# Patient Record
Sex: FEMALE | Race: WHITE | NOT HISPANIC OR LATINO | Employment: FULL TIME | URBAN - METROPOLITAN AREA
[De-identification: names, ages, dates, MRNs, and addresses within clinical notes are randomized per-mention and may not be internally consistent; named-entity substitution may affect disease eponyms.]

---

## 2017-03-21 ENCOUNTER — MYC MEDICAL ADVICE (OUTPATIENT)
Dept: FAMILY MEDICINE | Facility: OTHER | Age: 37
End: 2017-03-21

## 2017-10-20 ENCOUNTER — ALLIED HEALTH/NURSE VISIT (OUTPATIENT)
Dept: FAMILY MEDICINE | Facility: OTHER | Age: 37
End: 2017-10-20
Payer: COMMERCIAL

## 2017-10-20 DIAGNOSIS — Z23 NEED FOR PROPHYLACTIC VACCINATION AND INOCULATION AGAINST INFLUENZA: Primary | ICD-10-CM

## 2017-10-20 PROCEDURE — 99207 ZZC NO CHARGE NURSE ONLY: CPT

## 2017-10-20 PROCEDURE — 90471 IMMUNIZATION ADMIN: CPT

## 2017-10-20 PROCEDURE — 90686 IIV4 VACC NO PRSV 0.5 ML IM: CPT

## 2017-10-20 NOTE — PROGRESS NOTES
Prior to injection verified patient identity using patient's name and date of birth.    Injectable Influenza Immunization Documentation    1.  Is the person to be vaccinated sick today?   No    2. Does the person to be vaccinated have an allergy to a component   of the vaccine?   No  Egg Allergy Algorithm Link    3. Has the person to be vaccinated ever had a serious reaction   to influenza vaccine in the past?   No    4. Has the person to be vaccinated ever had Guillain-Barré syndrome?   No    Form completed by Elin Medina [facility] ([facility])  Per orders of Romero Hernandez, injection of Flu given by Elin Medina. Patient instructed to remain in clinic for 15 minutes afterwards, and to report any adverse reaction to me immediately.

## 2017-10-20 NOTE — MR AVS SNAPSHOT
After Visit Summary   10/20/2017    Sharon Caraballo    MRN: [id number]           Patient Information     Date Of Birth          1980        Visit Information        Provider Department      10/20/2017 4:00 PM NL FLU SHOT [facility]        Today's Diagnoses     Need for prophylactic vaccination and inoculation against influenza    -  1       Follow-ups after your visit        Your next 10 appointments already scheduled     Nov 27, 2017  2:30 PM [facility]   [facility] Rad[facility]    [address] [facility] [address] [phone]              Who to contact     If you have questions or need follow up information about today's clinic visit or your schedule please contact [facility] directly at [phone].  Normal or non-critical lab and imaging results will be communicated to you by MyChart, letter or phone within 4 business days after the clinic has received the results. If you do not hear from us within 7 days, please contact the clinic through MyChart or phone. If you have a critical or abnormal lab result, we will notify you by phone as soon as possible.  Submit refill requests through [employer] or call your pharmacy and they will forward the refill request to us. Please allow 3 business days for your refill to be completed.          Additional Information About Your Visit        MyChart Information     [employer] gives you secure access to your electronic health record. If you see a primary care provider, you can also send messages to your care team and make appointments. If you have questions, please call your primary care clinic.  If you do not have a primary care provider, please call [phone] and they will assist you.        Care EveryWhere ID     This is your Care EveryWhere ID. This could be used by other organizations to access your [facility]  records  [id number]         Blood Pressure from Last 3 Encounters:   11/23/16 102/58   06/30/15 100/60   05/19/15 101/65    Weight from Last 3 Encounters:   11/23/16 138 lb 14.4 oz (63 kg)   06/30/15 156 lb (70.8 kg)   05/11/15 179 lb (81.2 kg)              We Performed the Following     FLU VAC, SPLIT VIRUS IM > 3 YO (QUADRIVALENT) [59088]     Vaccine Administration, Initial [55589]        Primary Care Provider Office Phone # Fax #    Neda Roqueisabella Mancilla [phone] [address] [place] [address]        Equal Access to Services     DARRION WHITE : Hadii ellie hearn Soginette, waaxda luqadaha, qaybta kaalmada hayden, katie darden. So [facility] [phone].    ATENCIÓN: Si habla español, tiene a woods disposición servicios gratuitos de asistencia lingüística. Llame al [phone].    We comply with applicable federal civil rights laws and [place] laws. We do not discriminate on the basis of race, color, national origin, age, disability, sex, sexual orientation, or gender identity.            Thank you!     Thank you for choosing [facility]  for your care. Our goal is always to provide you with excellent care. Hearing back from our patients is one way we can continue to improve our services. Please take a few minutes to complete the written survey that you may receive in the mail after your visit with us. Thank you!             Your Updated Medication List - Protect others around you: Learn how to safely use, store and throw away your medicines at www.disposemymeds.org.          This list is accurate as of: 10/20/17  4:50 PM.  Always use your most recent med list.                   Brand Name Dispense Instructions for use Diagnosis    buPROPion 150 MG 12 hr tablet    WELLBUTRIN SR    60 tablet    Take 1 tablet once daily and increase to 1 tablet twice daily after 4 to 7 days    Encounter for tobacco use cessation counseling       ferrous  gluconate 324 (38 FE) MG tablet    FERGON    90 tablet    Take 1 tablet (324 mg) by mouth daily (with breakfast)        norethindrone 0.35 MG per tablet    MICRONOR    3 Package    Take 1 tablet (0.35 mg) by mouth daily    General counseling for prescription of oral contraceptives       order for DME     1 Device    Equipment being ordered:  Electric breast pump Sig:  Use as directed    Breast feeding status of mother       oxyCODONE-acetaminophen 5-325 MG per tablet    PERCOCET    30 tablet    Take 1 tablet by mouth every 4 hours as needed for moderate to severe pain     (spontaneous vaginal delivery)       prenatal multivitamin plus iron 27-0.8 MG Tabs per tablet      Take 1 tablet by mouth daily        triamcinolone 0.1 % cream    KENALOG    120 g    Apply sparingly to affected area two times daily as needed    Rash and nonspecific skin eruption

## 2017-11-21 NOTE — PROGRESS NOTES
SUBJECTIVE:   CC: Sharon Caraballo is an 36 year old woman who presents for preventive health visit.     Physical   Annual:     Getting at least 3 servings of Calcium per day::  NO    Bi-annual eye exam::  Yes    Dental care twice a year::  Yes    Sleep apnea or symptoms of sleep apnea::  None    Diet::  Regular (no restrictions)    Frequency of exercise::  None    Taking medications regularly::  Yes    Medication side effects::  Not applicable    Additional concerns today::  YES (check spot on right spot on tip of nose, tobacco cessation )      The spot on her nose has been there for a year or so it is not changing except maybe is getting darker. He has no symptoms associated with no spontaneous bleeding    She is interested in quitting smoking, she tried Wellbutrin however gave her violent diarrhea and vomiting. She tried it twice thinking perhaps she just had gastroenteritis but it happened both times. She is apprehensive to take chantix. She is smoking less than a pack a day.        Today's PHQ-2 Score:   PHQ-2 ( 1999 Pfizer) 11/27/2017   Q1: Little interest or pleasure in doing things 1   Q2: Feeling down, depressed or hopeless 1   PHQ-2 Score 2   Q1: Little interest or pleasure in doing things Several days   Q2: Feeling down, depressed or hopeless Several days   PHQ-2 Score 2       Abuse: Current or Past(Physical, Sexual or Emotional)- No  Do you feel safe in your environment - Yes    Social History   Substance Use Topics     Smoking status: Current Every Day Smoker     Packs/day: 0.50     Years: 10.00     Types: Cigarettes     Last attempt to quit: 1/23/2017     Smokeless tobacco: Never Used      Comment: 4-5 cigs per day     Alcohol use No      Comment: rare     The patient does not drink >3 drinks per day nor >7 drinks per week.    Reviewed orders with patient.  Reviewed health maintenance and updated orders accordingly - Yes  Labs reviewed in EPIC  BP Readings from Last 3 Encounters:   11/27/17  "102/60   11/23/16 102/58   06/30/15 100/60    Wt Readings from Last 3 Encounters:   11/27/17 131 lb (59.4 kg)   11/23/16 138 lb 14.4 oz (63 kg)   06/30/15 156 lb (70.8 kg)                      Mammogram not appropriate for this patient based on age.    Pertinent mammograms are reviewed under the imaging tab.  History of abnormal Pap smear:   Last 3 Pap and HPV Results:   PAP / HPV 7/18/2014   PAP NIL       Reviewed and updated as needed this visit by clinical staffTobacco  Allergies  Meds  Med Hx  Surg Hx  Fam Hx  Soc Hx        Reviewed and updated as needed this visit by Provider            Review of Systems  C: NEGATIVE for fever, chills, change in weight  INTEGUMENTARY/SKIN: Spot on nose  E: NEGATIVE for vision changes or irritation  ENT: NEGATIVE for ear, mouth and throat problems  R: NEGATIVE for significant cough or SOB  B: NEGATIVE for masses, tenderness or discharge  CV: Intermittent episodes of palpitations lasting only seconds and was associated symptoms  She has had them in the past, the completely resolved when she was not using any caffeine or tobacco during her most recent pregnancy  GI: NEGATIVE for nausea, abdominal pain, heartburn, or change in bowel habits  : NEGATIVE for unusual urinary or vaginal symptoms. Periods are regular.  MUSCULOSKELETAL:Low back pain, better since the birth of her daughter 3 years ago does not require any medications to treat pain  N: NEGATIVE for weakness, dizziness or paresthesias  PSYCHIATRIC: Anxiety at times nothing requiring treatment     OBJECTIVE:   /60  Pulse 94  Temp 98.4  F (36.9  C) (Temporal)  Resp 12  Ht 5' 2.8\" (1.595 m)  Wt 131 lb (59.4 kg)  LMP 11/08/2017 (Approximate)  BMI 23.36 kg/m2  Physical Exam  GENERAL: healthy, alert and no distress  EYES: Eyes grossly normal to inspection, PERRL and conjunctivae and sclerae normal  HENT: ear canals and TM's normal, nose and mouth without ulcers or lesions  NECK: no adenopathy, no asymmetry, " masses, or scars and thyroid normal to palpation  RESP: lungs clear to auscultation - no rales, rhonchi or wheezes  BREAST: normal without masses, tenderness or nipple discharge and no palpable axillary masses or adenopathy  CV: regular rate and rhythm, normal S1 S2, no S3 or S4, no murmur, click or rub, no peripheral edema and peripheral pulses strong  ABDOMEN: soft, nontender, no hepatosplenomegaly, no masses and bowel sounds normal   (female): normal female external genitalia, normal urethral meatus, vaginal mucosa pink, moist, well rugated, and normal cervix/adnexa/uterus without masses or discharge  MS: no gross musculoskeletal defects noted, no edema  SKIN: no suspicious lesions or rashes  SKIN: Erythematous rough nevus on the right side of the tip of her nose  NEURO: Normal strength and tone, mentation intact and speech normal  PSYCH: mentation appears normal, affect normal/bright    ASSESSMENT/PLAN:   1. Encounter for routine adult health examination without abnormal findings  Healthy female    2. Screening for malignant neoplasm of cervix    - Pap imaged thin layer screen with HPV - recommended age 30 - 65  - HPV High Risk Types DNA Cervical    3. Encounter for tobacco use cessation counseling  She will not smoke while using the patch, If palpitations are changing worsening or do not resolve when she is quit smoking she will let me know  - nicotine (NICODERM CQ) 14 MG/24HR 24 hr patch; Place 1 patch onto the skin every 24 hours  Dispense: 30 patch; Refill: 0  - nicotine (NICODERM CQ) 7 MG/24HR 24 hr patch; Place 1 patch onto the skin every 24 hours  Dispense: 30 patch; Refill: 0    4. Nevus    - DERMATOLOGY REFERRAL    COUNSELING:  Reviewed preventive health counseling, as reflected in patient instructions         reports that she has been smoking Cigarettes.  She has a 5.00 pack-year smoking history. She has never used smokeless tobacco.  Tobacco Cessation Action Plan: Pharmacotherapies : Nicotine  "patch  Estimated body mass index is 23.36 kg/(m^2) as calculated from the following:    Height as of this encounter: 5' 2.8\" (1.595 m).    Weight as of this encounter: 131 lb (59.4 kg).         Counseling Resources:  ATP IV Guidelines  Pooled Cohorts Equation Calculator  Breast Cancer Risk Calculator  FRAX Risk Assessment  ICSI Preventive Guidelines  Dietary Guidelines for Americans, 2010  USDA's MyPlate  ASA Prophylaxis  Lung CA Screening    Radha Trent PA-C  [facility] HospitalElectronically signed by Radha Trent PA-C   "

## 2017-11-26 ENCOUNTER — HEALTH MAINTENANCE LETTER (OUTPATIENT)
Age: 37
End: 2017-11-26

## 2017-11-27 ENCOUNTER — OFFICE VISIT (OUTPATIENT)
Dept: FAMILY MEDICINE | Facility: OTHER | Age: 37
End: 2017-11-27
Payer: COMMERCIAL

## 2017-11-27 VITALS
RESPIRATION RATE: 12 BRPM | BODY MASS INDEX: 23.21 KG/M2 | HEART RATE: 94 BPM | SYSTOLIC BLOOD PRESSURE: 102 MMHG | HEIGHT: 63 IN | DIASTOLIC BLOOD PRESSURE: 60 MMHG | TEMPERATURE: 98.4 F | WEIGHT: 131 LBS

## 2017-11-27 DIAGNOSIS — Z71.6 ENCOUNTER FOR TOBACCO USE CESSATION COUNSELING: ICD-10-CM

## 2017-11-27 DIAGNOSIS — Z00.00 ENCOUNTER FOR ROUTINE ADULT HEALTH EXAMINATION WITHOUT ABNORMAL FINDINGS: Primary | ICD-10-CM

## 2017-11-27 DIAGNOSIS — Z12.4 SCREENING FOR MALIGNANT NEOPLASM OF CERVIX: ICD-10-CM

## 2017-11-27 DIAGNOSIS — D22.9 NEVUS: ICD-10-CM

## 2017-11-27 PROCEDURE — 87624 HPV HI-RISK TYP POOLED RSLT: CPT | Performed by: PHYSICIAN ASSISTANT

## 2017-11-27 PROCEDURE — 99395 PREV VISIT EST AGE 18-39: CPT | Performed by: PHYSICIAN ASSISTANT

## 2017-11-27 PROCEDURE — G0145 SCR C/V CYTO,THINLAYER,RESCR: HCPCS | Performed by: PHYSICIAN ASSISTANT

## 2017-11-27 RX ORDER — NICOTINE 21 MG/24HR
1 PATCH, TRANSDERMAL 24 HOURS TRANSDERMAL EVERY 24 HOURS
Qty: 30 PATCH | Refills: 0 | Status: SHIPPED | OUTPATIENT
Start: 2017-11-27 | End: 2019-10-04

## 2017-11-27 ASSESSMENT — PAIN SCALES - GENERAL: PAINLEVEL: NO PAIN (0)

## 2017-11-27 NOTE — NURSING NOTE
"Chief Complaint   Patient presents with     Physical     Panel Management     vanesa, pap       Initial /60  Pulse 94  Temp 98.4  F (36.9  C) (Temporal)  Resp 12  Ht 5' 2.8\" (1.595 m)  Wt 131 lb (59.4 kg)  LMP 11/08/2017 (Approximate)  BMI 23.36 kg/m2 Estimated body mass index is 23.36 kg/(m^2) as calculated from the following:    Height as of this encounter: 5' 2.8\" (1.595 m).    Weight as of this encounter: 131 lb (59.4 kg).  Medication Reconciliation: complete     Anayeli Garnica CMA (AAMA)    "

## 2017-11-27 NOTE — MR AVS SNAPSHOT
After Visit Summary   11/27/2017    Sharon Caraballo    MRN: [id number]           Patient Information     Date Of Birth          1980        Visit Information        Provider Department      11/27/2017 2:30 PM Radha Trent PA-C [facility]        Today's Diagnoses     Encounter for routine adult health examination without abnormal findings    -  1    Screening for malignant neoplasm of cervix        Encounter for tobacco use cessation counseling        Nevus          Care Instructions      Preventive Health Recommendations  Female Ages 26 - 39  Yearly exam:   See your health care provider every year in order to    Review health changes.     Discuss preventive care.      Review your medicines if you your doctor has prescribed any.    Until age 30: Get a Pap test every three years (more often if you have had an abnormal result).    After age 30: Talk to your doctor about whether you should have a Pap test every 3 years or have a Pap test with HPV screening every 5 years.   You do not need a Pap test if your uterus was removed (hysterectomy) and you have not had cancer.  You should be tested each year for STDs (sexually transmitted diseases), if you're at risk.   Talk to your provider about how often to have your cholesterol checked.  If you are at risk for diabetes, you should have a diabetes test (fasting glucose).  Shots: Get a flu shot each year. Get a tetanus shot every 10 years.   Nutrition:     Eat at least 5 servings of fruits and vegetables each day.    Eat whole-grain bread, whole-wheat pasta and brown rice instead of white grains and rice.    Talk to your provider about Calcium and Vitamin D.     Lifestyle    Exercise at least 150 minutes a week (30 minutes a day, 5 days of the week). This will help you control your weight and prevent disease.    Limit alcohol to one drink per day.    No smoking.     Wear sunscreen to prevent skin cancer.    See your dentist every six  months for an exam and cleaning.            Follow-ups after your visit        Additional Services     DERMATOLOGY REFERRAL       Your provider has referred you to: [facility]: [facility] [phone]   [email or web address]    Please be aware that coverage of these services is subject to the terms and limitations of your health insurance plan.  Call member services at your health plan with any benefit or coverage questions.      Please bring the following with you to your appointment:    (1) Any X-Rays, CTs or MRIs which have been performed.  Contact the facility where they were done to arrange for  prior to your scheduled appointment.    (2) List of current medications  (3) This referral request   (4) Any documents/labs given to you for this referral                  Who to contact     If you have questions or need follow up information about today's clinic visit or your schedule please contact [facility] [place] directly at [phone].  Normal or non-critical lab and imaging results will be communicated to you by [employer], letter or phone within 4 business days after the clinic has received the results. If you do not hear from us within 7 days, please contact the clinic through [employer] or phone. If you have a critical or abnormal lab result, we will notify you by phone as soon as possible.  Submit refill requests through [employer] or call your pharmacy and they will forward the refill request to us. Please allow 3 business days for your refill to be completed.          Additional Information About Your Visit        [employer] Information     [employer] gives you secure access to your electronic health record. If you see a primary care provider, you can also send messages to your care team and make appointments. If you have questions, please call your primary care clinic.  If you do not have a primary care provider, please call  "[phone] and they will assist you.        Care EveryWhere ID     This is your Care EveryWhere ID. This could be used by other organizations to access your [place] medical records  [id number]        Your Vitals Were     Pulse Temperature Respirations Height Last Period BMI (Body Mass Index)    94 98.4  F (36.9  C) (Temporal) 12 5' 2.8\" (1.595 m) 11/08/2017 (Approximate) 23.36 kg/m2       Blood Pressure from Last 3 Encounters:   11/27/17 102/60   11/23/16 102/58   06/30/15 100/60    Weight from Last 3 Encounters:   11/27/17 131 lb (59.4 kg)   11/23/16 138 lb 14.4 oz (63 kg)   06/30/15 156 lb (70.8 kg)              We Performed the Following     DERMATOLOGY REFERRAL     HPV High Risk Types DNA Cervical     Pap imaged thin layer screen with HPV - recommended age 30 - 65          Today's Medication Changes          These changes are accurate as of: 11/27/17  3:12 PM.  If you have any questions, ask your nurse or doctor.               Start taking these medicines.        Dose/Directions    * nicotine 14 MG/24HR 24 hr patch   Commonly known as:  NICODERM CQ   Used for:  Encounter for tobacco use cessation counseling   Started by:  Radha Trent PA-C        Dose:  1 patch   Place 1 patch onto the skin every 24 hours   Quantity:  30 patch   Refills:  0       * nicotine 7 MG/24HR 24 hr patch   Commonly known as:  NICODERM CQ   Used for:  Encounter for tobacco use cessation counseling   Started by:  Radha Trent PA-C        Dose:  1 patch   Place 1 patch onto the skin every 24 hours   Quantity:  30 patch   Refills:  0       * Notice:  This list has 2 medication(s) that are the same as other medications prescribed for you. Read the directions carefully, and ask your doctor or other care provider to review them with you.      Stop taking these medicines if you haven't already. Please contact your care team if you have questions.     buPROPion 150 MG 12 hr tablet   Commonly known as:  WELLBUTRIN SR   Stopped by:  " Radha Trent PA-C                Where to get your medicines      These medications were sent to [facility] - [place], [place] - [address]   [address] [place] [address]     Phone:  [phone]     nicotine 14 MG/24HR 24 hr patch    nicotine 7 MG/24HR 24 hr patch                Primary Care Provider Office Phone # Fax #    Neda Mancilla [phone] [address] [place] [address]        Equal Access to Services     [facility]: Hadii aad ku hadasho Soomaali, waaxda luqadaha, qaybta kaalmada adeegyada, waxay idiin hayaan adeeg kharash la'aan . So [facility] [phone].    ATENCIÓN: Si habla español, tiene a woods disposición servicios gratuitos de asistencia lingüística. LlMetroHealth Main Campus Medical Center [phone].    We comply with applicable federal civil rights laws and [place] laws. We do not discriminate on the basis of race, color, national origin, age, disability, sex, sexual orientation, or gender identity.            Thank you!     Thank you for choosing [facility]  for your care. Our goal is always to provide you with excellent care. Hearing back from our patients is one way we can continue to improve our services. Please take a few minutes to complete the written survey that you may receive in the mail after your visit with us. Thank you!             Your Updated Medication List - Protect others around you: Learn how to safely use, store and throw away your medicines at www.disposemymeds.org.          This list is accurate as of: 11/27/17  3:12 PM.  Always use your most recent med list.                   Brand Name Dispense Instructions for use Diagnosis    * nicotine 14 MG/24HR 24 hr patch    NICODERM CQ    30 patch    Place 1 patch onto the skin every 24 hours    Encounter for tobacco use cessation counseling       * nicotine 7 MG/24HR 24 hr patch    NICODERM CQ    30 patch    Place 1 patch onto the skin every 24 hours    Encounter for  tobacco use cessation counseling       * Notice:  This list has 2 medication(s) that are the same as other medications prescribed for you. Read the directions carefully, and ask your doctor or other care provider to review them with you.

## 2017-11-29 LAB
COPATH REPORT: NORMAL
PAP: NORMAL

## 2017-11-30 LAB
FINAL DIAGNOSIS: NORMAL
HPV HR 12 DNA CVX QL NAA+PROBE: NEGATIVE
HPV16 DNA SPEC QL NAA+PROBE: NEGATIVE
HPV18 DNA SPEC QL NAA+PROBE: NEGATIVE
SPECIMEN DESCRIPTION: NORMAL

## 2017-12-11 ENCOUNTER — TELEPHONE (OUTPATIENT)
Dept: FAMILY MEDICINE | Facility: CLINIC | Age: 37
End: 2017-12-11

## 2017-12-11 NOTE — LETTER
[facility]  [address]  [place] [address]  [phone]      December 11, 2017    Sharon Caraballo                                                                                                                     [address] [address] [place] [id number]        Dear Sharon,    We received a notice that you are to be scheduled with a specialty clinic. The referral has been placed by your provider and you can call to schedule an appointment directly.     Enclosed, you will find the referral with the phone number to call to schedule an appointment.  If you have already scheduled this, you may disregard this letter.    Please call us if you have any questions or concerns.        Sincerely,     [facility] Support Staff / paulo

## 2017-12-11 NOTE — TELEPHONE ENCOUNTER
You placed a referral for patient to dermatology on 11/27/17.  Patient has not scheduled as of yet.      Please review and forward to team if follow up with the patient is needed.     Thank you!  Rachel/Clinic Referrals Dyad II

## 2018-08-31 ENCOUNTER — OFFICE VISIT (OUTPATIENT)
Dept: FAMILY MEDICINE | Facility: OTHER | Age: 38
End: 2018-08-31
Payer: COMMERCIAL

## 2018-08-31 ENCOUNTER — TELEPHONE (OUTPATIENT)
Dept: FAMILY MEDICINE | Facility: OTHER | Age: 38
End: 2018-08-31

## 2018-08-31 VITALS
SYSTOLIC BLOOD PRESSURE: 96 MMHG | HEART RATE: 104 BPM | DIASTOLIC BLOOD PRESSURE: 60 MMHG | WEIGHT: 137 LBS | HEIGHT: 63 IN | BODY MASS INDEX: 24.27 KG/M2 | RESPIRATION RATE: 14 BRPM | TEMPERATURE: 98.7 F | OXYGEN SATURATION: 100 %

## 2018-08-31 DIAGNOSIS — J20.9 ACUTE BRONCHITIS WITH SYMPTOMS > 10 DAYS: Primary | ICD-10-CM

## 2018-08-31 DIAGNOSIS — F17.200 TOBACCO DEPENDENCE SYNDROME: ICD-10-CM

## 2018-08-31 PROCEDURE — 99213 OFFICE O/P EST LOW 20 MIN: CPT | Performed by: STUDENT IN AN ORGANIZED HEALTH CARE EDUCATION/TRAINING PROGRAM

## 2018-08-31 RX ORDER — AZITHROMYCIN 250 MG/1
TABLET, FILM COATED ORAL
Qty: 6 TABLET | Refills: 0 | Status: SHIPPED | OUTPATIENT
Start: 2018-08-31 | End: 2018-09-19

## 2018-08-31 ASSESSMENT — PATIENT HEALTH QUESTIONNAIRE - PHQ9
10. IF YOU CHECKED OFF ANY PROBLEMS, HOW DIFFICULT HAVE THESE PROBLEMS MADE IT FOR YOU TO DO YOUR WORK, TAKE CARE OF THINGS AT HOME, OR GET ALONG WITH OTHER PEOPLE: NOT DIFFICULT AT ALL
SUM OF ALL RESPONSES TO PHQ QUESTIONS 1-9: 0
SUM OF ALL RESPONSES TO PHQ QUESTIONS 1-9: 0

## 2018-08-31 ASSESSMENT — PAIN SCALES - GENERAL: PAINLEVEL: NO PAIN (0)

## 2018-08-31 NOTE — MR AVS SNAPSHOT
"              After Visit Summary   8/31/2018    Sharon Caraballo    MRN: [id number]           Patient Information     Date Of Birth          1980        Visit Information        Provider Department      8/31/2018 2:20 PM Ashley Post APRN [facility]        Today's Diagnoses     Acute bronchitis with symptoms > 10 days    -  1      Care Instructions    Azithromycin - 2 tablets today and then one tablet daily for 4 days.    Come back in if your symptoms are worsening.    Ashley Post, NP-C            Follow-ups after your visit        Who to contact     If you have questions or need follow up information about today's clinic visit or your schedule please contact [facility] directly at [phone].  Normal or non-critical lab and imaging results will be communicated to you by Barnaclehart, letter or phone within 4 business days after the clinic has received the results. If you do not hear from us within 7 days, please contact the clinic through Barnaclehart or phone. If you have a critical or abnormal lab result, we will notify you by phone as soon as possible.  Submit refill requests through Yoink Games or call your pharmacy and they will forward the refill request to us. Please allow 3 business days for your refill to be completed.          Additional Information About Your Visit        MyChart Information     Yoink Games lets you send messages to your doctor, view your test results, renew your prescriptions, schedule appointments and more. To sign up, go to [email or web address] . Click on \"Log in\" on the left side of the screen, which will take you to the Welcome page. Then click on \"Sign up Now\" on the right side of the page.     You will be asked to enter the access code listed below, as well as some personal information. Please follow the directions to create your username and password.     Your access code is: [email or web address]  Expires: 11/29/2018  2:44 PM     Your " "access code will  in 90 days. If you need help or a new code, please call your [facility] clinic or [phone].        Care EveryWhere ID     This is your Care EveryWhere ID. This could be used by other organizations to access your [facility] medical records  [id number]        Your Vitals Were     Pulse Temperature Respirations Height Pulse Oximetry BMI (Body Mass Index)    104 98.7  F (37.1  C) 14 5' 2.99\" (1.6 m) 100% 24.27 kg/m2       Blood Pressure from Last 3 Encounters:   18 96/60   17 102/60   16 102/58    Weight from Last 3 Encounters:   18 137 lb (62.1 kg)   17 131 lb (59.4 kg)   16 138 lb 14.4 oz (63 kg)              Today, you had the following     No orders found for display         Today's Medication Changes          These changes are accurate as of 18  2:44 PM.  If you have any questions, ask your nurse or doctor.               Start taking these medicines.        Dose/Directions    azithromycin 250 MG tablet   Commonly known as:  ZITHROMAX   Used for:  Acute bronchitis with symptoms > 10 days   Started by:  Ashley Post APRN CNP        Two tablets first day, then one tablet daily for four days.   Quantity:  6 tablet   Refills:  0            Where to get your medicines      These medications were sent to [facility] [address]   [address] [place] [address]     Phone:  [phone]     azithromycin 250 MG tablet                Primary Care Provider Office Phone # Fax #    Radha Trent PA-C [phone] [address] [place] [address]        Equal Access to Services     DARRION WHITE AH: Shahriar Saleh, pelon escobar, qamendelta kaalmada hayden, katie darden. So [facility] [phone].    ATENCIÓN: Si habla español, tiene a woods disposición servicios gratuitos de asistencia lingüística. Llame al [phone].    We comply with " applicable federal civil rights laws and [place] laws. We do not discriminate on the basis of race, color, national origin, age, disability, sex, sexual orientation, or gender identity.            Thank you!     Thank you for choosing [facility]  for your care. Our goal is always to provide you with excellent care. Hearing back from our patients is one way we can continue to improve our services. Please take a few minutes to complete the written survey that you may receive in the mail after your visit with us. Thank you!             Your Updated Medication List - Protect others around you: Learn how to safely use, store and throw away your medicines at www.disposemymeds.org.          This list is accurate as of 8/31/18  2:44 PM.  Always use your most recent med list.                   Brand Name Dispense Instructions for use Diagnosis    azithromycin 250 MG tablet    ZITHROMAX    6 tablet    Two tablets first day, then one tablet daily for four days.    Acute bronchitis with symptoms > 10 days       * nicotine 14 MG/24HR 24 hr patch    NICODERM CQ    30 patch    Place 1 patch onto the skin every 24 hours    Encounter for tobacco use cessation counseling       * nicotine 7 MG/24HR 24 hr patch    NICODERM CQ    30 patch    Place 1 patch onto the skin every 24 hours    Encounter for tobacco use cessation counseling       * Notice:  This list has 2 medication(s) that are the same as other medications prescribed for you. Read the directions carefully, and ask your doctor or other care provider to review them with you.

## 2018-08-31 NOTE — PATIENT INSTRUCTIONS
Azithromycin - 2 tablets today and then one tablet daily for 4 days.    Come back in if your symptoms are worsening.    TYE OlivaC

## 2018-08-31 NOTE — TELEPHONE ENCOUNTER
Spoke with patient.  Cough is just lingering.  Started last week.  Sinus and ear pressure and then into chest. Getting greenish up when coughing.  Not sleeping well at up, restless.  No fever.  No headaches.    Huddled with EM.    Ok to work in this afternoon.    RECOMMENDED DISPOSITION:  See in 24 hours -   Will comply with recommendation: yes   If further questions/concerns or if Sx do not improve, worsen or new Sx develop, call your PCP or [facility] as soon as possible.    NOTES:  Disposition was determined by the first positive assessment question, therefore all previous assessment questions were negative.     Guideline used:  Telephone Triage Protocols for Nurses, Fifth Edition, Frances Poole, GABY, BSN

## 2018-08-31 NOTE — TELEPHONE ENCOUNTER
Reason for Call:  Same Day Appointment, Requested Provider:  any provider in [facility]    PCP: Radha Trent    Reason for visit:  Cold symptoms, cough, phlegm.  Sinus     Duration of symptoms: 1 week    Have you been treated for this in the past? No    Additional comments:  Patient would like to be seen today in [place]    Can we leave a detailed message on this number? YES    Phone number patient can be reached at: Cell number on file:    Telephone Information:   Mobile [phone]       Best Time: any    Call taken on 8/31/2018 at 11:59 AM by Ingrid Mosqueda

## 2018-08-31 NOTE — PROGRESS NOTES
"  SUBJECTIVE:   Sharon Caraballo is a 37 year old female who presents to clinic today for the following health issues:      History of Present Illness     Diet:  Other  Frequency of exercise:  None  Taking medications regularly:  Yes  Medication side effects:  Not applicable  Additional concerns today:  No    Acute Illness   Acute illness concerns: Cough, chest and sinus congestion  Onset: over a week    Fever: no     Chills/Sweats: no     Headache (location?): YES    Sinus Pressure:YES    Conjunctivitis:  no    Ear Pain: YES: left    Rhinorrhea: no     Congestion: YES    Sore Throat: no      Cough: YES    Wheeze: YES    Decreased Appetite: no     Nausea: no     Vomiting: no     Diarrhea:  no     Dysuria/Freq.: no     Fatigue/Achiness: no     Sick/Strep Exposure: no      Therapies Tried and outcome: OTC allergy med without relief    Problem list and histories reviewed & adjusted, as indicated.  Additional history: as documented    Labs reviewed in EPIC    ROS:  Constitutional, HEENT, cardiovascular, pulmonary, gi and gu systems are negative, except as otherwise noted.    OBJECTIVE:     BP 96/60  Pulse 104  Temp 98.7  F (37.1  C)  Resp 14  Ht 5' 2.99\" (1.6 m)  Wt 137 lb (62.1 kg)  SpO2 100%  BMI 24.27 kg/m2  Body mass index is 24.27 kg/(m^2).  GENERAL: alert and fatigued  EYES: Eyes grossly normal to inspection, PERRL and conjunctivae and sclerae normal  HENT: ear canals normal, TMs dull, posterior pharynx erythematous, tenderness to palpation over maxillary sinuses, nasal passages congested  NECK: no adenopathy, no asymmetry, masses, or scars   RESP: lungs clear to auscultation - no rales, rhonchi or wheezes  CV: regular rate and rhythm, normal S1 S2, no S3 or S4, no murmur, click or rub  MS: no gross musculoskeletal defects noted  SKIN: no suspicious lesions or rashes  NEURO: Normal strength and tone, mentation intact and speech normal  PSYCH: mentation appears normal, affect normal/bright, judgement and " insight intact and appearance well groomed    Diagnostic Test Results:  none     ASSESSMENT/PLAN:     1. Acute bronchitis with symptoms > 10 days  2. Tobacco dependence syndrome  Given symptoms are worsening, purulent sputum and that patient is a smoker, I have recommended treatment with antibiotic. Recommended patient follow up if symptoms persist or worsen.  - azithromycin (ZITHROMAX) 250 MG tablet; Two tablets first day, then one tablet daily for four days.  Dispense: 6 tablet; Refill: 0    CHAZ Krishna [facility] for HPI/ROS submitted by the patient on 8/31/2018   PHQ-2 Score: 0  If you checked off any problems, how difficult have these problems made it for you to do your work, take care of things at home, or get along with other people?: Not difficult at all  PHQ9 TOTAL SCORE: 0

## 2018-09-01 ASSESSMENT — PATIENT HEALTH QUESTIONNAIRE - PHQ9: SUM OF ALL RESPONSES TO PHQ QUESTIONS 1-9: 0

## 2018-09-19 ENCOUNTER — OFFICE VISIT (OUTPATIENT)
Dept: FAMILY MEDICINE | Facility: OTHER | Age: 38
End: 2018-09-19
Payer: COMMERCIAL

## 2018-09-19 VITALS
OXYGEN SATURATION: 100 % | RESPIRATION RATE: 16 BRPM | TEMPERATURE: 99.2 F | SYSTOLIC BLOOD PRESSURE: 122 MMHG | HEART RATE: 95 BPM | BODY MASS INDEX: 24.98 KG/M2 | DIASTOLIC BLOOD PRESSURE: 64 MMHG | WEIGHT: 141 LBS

## 2018-09-19 DIAGNOSIS — L30.9 DERMATITIS: Primary | ICD-10-CM

## 2018-09-19 PROCEDURE — 99213 OFFICE O/P EST LOW 20 MIN: CPT | Performed by: PHYSICIAN ASSISTANT

## 2018-09-19 RX ORDER — TRIAMCINOLONE ACETONIDE 5 MG/G
CREAM TOPICAL
Qty: 30 G | Refills: 0 | Status: SHIPPED | OUTPATIENT
Start: 2018-09-19 | End: 2019-10-04

## 2018-09-19 NOTE — PATIENT INSTRUCTIONS
This appears to be an allergic reaction to something, but I am unsure exactly as to what.  It is not contagious.  I recommend steroid cream to use 3 times daily as needed for the itching and redness along with nightly Benadryl.  Avoid hot showers.  If you have a worsening rash or it is not improving, you should be seen again.

## 2018-09-19 NOTE — NURSING NOTE
"Chief Complaint   Patient presents with     Derm Problem       Initial /64  Pulse 95  Temp 99.2  F (37.3  C) (Temporal)  Resp 16  Wt 141 lb (64 kg)  SpO2 100%  BMI 24.98 kg/m2 Estimated body mass index is 24.98 kg/(m^2) as calculated from the following:    Height as of 8/31/18: 5' 2.99\" (1.6 m).    Weight as of this encounter: 141 lb (64 kg).  Medication Reconciliation: complete    Nancy Cali, TANIA      "

## 2018-09-19 NOTE — PROGRESS NOTES
"  SUBJECTIVE:   Sharon Caraballo is a 37 year old female who presents to clinic today for the following health issues:      HPI  Rash  Onset: woke up with it this morning    Description:   Location: \"on mid torso, buttocks, knees are itching.\"  Character: round, raised, red, \"looked like welts this morning\"  Itching (Pruritis): YES    Progression of Symptoms:  improving    Accompanying Signs & Symptoms:  Fever: no   Body aches or joint pain: no   Sore throat symptoms: no   Recent cold symptoms: no - did have a cough a few weeks ago, was on Zithromax on 08/31    History:   Previous similar rash: no     Precipitating factors:   Exposure to similar rash: no   New exposures: None   Recent travel: no     Alleviating factors:  none    Therapies Tried and outcome: \"immediately took a shower when I woke up but otherwise nothing\"    Sharon presents the clinic with concern of a rash she noticed this morning. The rash is raised, pink, and distributed along both hips and buttocks and over both knees. The rash is itchy. This morning there was a burning pain after she took a shower but now it is more itchy and irritating. She reports that some of the spots have went away and most of them now appear flat. She denies any numbness or tingling.   She denies any new products, foods (besides bugles last night), clothing, or medications. She not taken any medications orally or applied anything topically to the area. She inspected her bed and sheets and did not see anything.  does not have a rash. She reports mild congestion which has been improving following an episode of bronchitis in which she was seen in clinic on 08/31/18 for and prescribed zithromax. She denies any previous rashes to zithromax.  Denies recent travel or sick contacts.     Problem list and histories reviewed & adjusted, as indicated.    ROS:  CONSTITUTIONAL: NEGATIVE for fever, chills, change in weight  INTEGUMENTARY/SKIN: +Itching rash.  ENT/MOUTH: " NEGATIVE for ear, throat problems, + for congestion which is improving  RESP: NEGATIVE for significant cough or SOB  CV: NEGATIVE for chest pain, palpitations or peripheral edema  MUSCULOSKELETAL: NEGATIVE for significant arthralgias or myalgia  NEURO: NEGATIVE for weakness, dizziness or paresthesias    OBJECTIVE:     /64  Pulse 95  Temp 99.2  F (37.3  C) (Temporal)  Resp 16  Wt 141 lb (64 kg)  SpO2 100%  BMI 24.98 kg/m2  Body mass index is 24.98 kg/(m^2).     GENERAL: healthy, alert and no distress  RESP: lungs clear to auscultation - no rales, rhonchi or wheezes  CV: regular rate and rhythm, normal S1 S2, no S3 or S4, no murmur, click or rub  SKIN: many erythematous, blanchable, wheal-like plaques with a few macular lesions distributed along the lateral aspect of bilateral thighs and buttocks. Patches on the lateral aspect of bilateral knees slightly erythematous and blanchable. No warmth, tenderness, or drainage.     Diagnostic Test Results:  none     ASSESSMENT/PLAN:         ICD-10-CM    1. Dermatitis L30.9 triamcinolone (KENALOG) 0.5 % cream       Clinical presentation is consistent with dermatitis/allergic reaction of unknown etiology. Will prescribe Kenalog cream to apply directly to the irritated skin 3 times daily as needed to alleviate itching and redness. She can also use benadryl at night to relieve itching. She was recommended to avoid hot showers. We discussed that the rash is not contagious. If it seems to be worsening or not improving, she should be seen again. Otherwise, follow-up in 2 months for routine physical. She was encouraged to call the clinic with any concerns in the meantime.     Patient was seen in conjunction with Isabel GREER.    Jerad Villatoro PA-C  [facility]

## 2018-09-19 NOTE — MR AVS SNAPSHOT
After Visit Summary   9/19/2018    Sharon Caraballo    MRN: [id number]           Patient Information     Date Of Birth          1980        Visit Information        Provider Department      9/19/2018 10:00 AM Jerad Villatoro PA-C [facility]        Today's Diagnoses     Dermatitis    -  1      Care Instructions    This appears to be an allergic reaction to something, but I am unsure exactly as to what.  It is not contagious.  I recommend steroid cream to use 3 times daily as needed for the itching and redness along with nightly Benadryl.  Avoid hot showers.  If you have a worsening rash or it is not improving, you should be seen again.              Follow-ups after your visit        Follow-up notes from your care team     Return in about 2 months (around 11/19/2018).      Who to contact     If you have questions or need follow up information about today's clinic visit or your schedule please contact [facility] directly at [phone].  Normal or non-critical lab and imaging results will be communicated to you by Platypit, letter or phone within 4 business days after the clinic has received the results. If you do not hear from us within 7 days, please contact the clinic through Evolva or phone. If you have a critical or abnormal lab result, we will notify you by phone as soon as possible.  Submit refill requests through Evolva or call your pharmacy and they will forward the refill request to us. Please allow 3 business days for your refill to be completed.          Additional Information About Your Visit        Syncing.Nethart Information     Evolva gives you secure access to your electronic health record. If you see a primary care provider, you can also send messages to your care team and make appointments. If you have questions, please call your primary care clinic.  If you do not have a primary care provider, please call [phone] and they will assist you.         Care EveryWhere ID     This is your Care EveryWhere ID. This could be used by other organizations to access your [facility] medical records  [id number]        Your Vitals Were     Pulse Temperature Respirations Pulse Oximetry BMI (Body Mass Index)       95 99.2  F (37.3  C) (Temporal) 16 100% 24.98 kg/m2        Blood Pressure from Last 3 Encounters:   09/19/18 122/64   08/31/18 96/60   11/27/17 102/60    Weight from Last 3 Encounters:   09/19/18 141 lb (64 kg)   08/31/18 137 lb (62.1 kg)   11/27/17 131 lb (59.4 kg)              Today, you had the following     No orders found for display         Today's Medication Changes          These changes are accurate as of 9/19/18 10:43 AM.  If you have any questions, ask your nurse or doctor.               Start taking these medicines.        Dose/Directions    triamcinolone 0.5 % cream   Commonly known as:  KENALOG   Used for:  Dermatitis   Started by:  Jerad Villatoro PA-C        Apply sparingly to affected area three times daily.   Quantity:  30 g   Refills:  0            Where to get your medicines      These medications were sent to [facility] [address]  [address] [facility] [address]     Phone:  [phone]     triamcinolone 0.5 % cream                Primary Care Provider Office Phone # Fax #    Radha Trent PA-C [phone] [address] [place] [address]        Equal Access to Services     DARRION WHITE AH: Hadii ellie phillips hadasho Soomaali, waaxda luqadaha, qaybta kaalmada alexegyasruthi, katie darden. So [facility] [phone].    ATENCIÓN: Si habla español, tiene a woods disposición servicios gratuitos de asistencia lingüística. Llame al [phone].    We comply with applicable federal civil rights laws and [place] laws. We do not discriminate on the basis of race, color, national origin, age, disability, sex, sexual orientation, or gender identity.            Thank  you!     Thank you for choosing [facility]  for your care. Our goal is always to provide you with excellent care. Hearing back from our patients is one way we can continue to improve our services. Please take a few minutes to complete the written survey that you may receive in the mail after your visit with us. Thank you!             Your Updated Medication List - Protect others around you: Learn how to safely use, store and throw away your medicines at www.disposemymeds.org.          This list is accurate as of 9/19/18 10:43 AM.  Always use your most recent med list.                   Brand Name Dispense Instructions for use Diagnosis    * nicotine 14 MG/24HR 24 hr patch    NICODERM CQ    30 patch    Place 1 patch onto the skin every 24 hours    Encounter for tobacco use cessation counseling       * nicotine 7 MG/24HR 24 hr patch    NICODERM CQ    30 patch    Place 1 patch onto the skin every 24 hours    Encounter for tobacco use cessation counseling       triamcinolone 0.5 % cream    KENALOG    30 g    Apply sparingly to affected area three times daily.    Dermatitis       * Notice:  This list has 2 medication(s) that are the same as other medications prescribed for you. Read the directions carefully, and ask your doctor or other care provider to review them with you.

## 2018-11-16 ENCOUNTER — ALLIED HEALTH/NURSE VISIT (OUTPATIENT)
Dept: FAMILY MEDICINE | Facility: OTHER | Age: 38
End: 2018-11-16
Payer: COMMERCIAL

## 2018-11-16 DIAGNOSIS — Z23 NEED FOR PROPHYLACTIC VACCINATION AND INOCULATION AGAINST INFLUENZA: Primary | ICD-10-CM

## 2018-11-16 PROCEDURE — 90471 IMMUNIZATION ADMIN: CPT

## 2018-11-16 PROCEDURE — 90686 IIV4 VACC NO PRSV 0.5 ML IM: CPT

## 2018-11-16 PROCEDURE — 99207 ZZC NO CHARGE NURSE ONLY: CPT

## 2018-11-16 NOTE — PROGRESS NOTES
Injectable Influenza Immunization Documentation    1.  Is the person to be vaccinated sick today?   No    2. Does the person to be vaccinated have an allergy to a component   of the vaccine?   No  Egg Allergy Algorithm Link    3. Has the person to be vaccinated ever had a serious reaction   to influenza vaccine in the past?   No    4. Has the person to be vaccinated ever had Guillain-Barré syndrome?   No    Form completed by Serina Huffman CMA    Prior to injection verified patient identity using patient's name and date of birth.  Due to injection administration, patient instructed to remain in clinic for 15 minutes  afterwards, and to report any adverse reaction to me immediately.

## 2018-11-16 NOTE — MR AVS SNAPSHOT
After Visit Summary   11/16/2018    Sharon Caraballo    MRN: [id number]           Patient Information     Date Of Birth          1980        Visit Information        Provider Department      11/16/2018 3:30 PM NL FLU SHOT [facility]        Today's Diagnoses     Need for prophylactic vaccination and inoculation against influenza    -  1       Follow-ups after your visit        Who to contact     If you have questions or need follow up information about today's clinic visit or your schedule please contact [facility] directly at [phone].  Normal or non-critical lab and imaging results will be communicated to you by MyChart, letter or phone within 4 business days after the clinic has received the results. If you do not hear from us within 7 days, please contact the clinic through MyChart or phone. If you have a critical or abnormal lab result, we will notify you by phone as soon as possible.  Submit refill requests through Siftit or call your pharmacy and they will forward the refill request to us. Please allow 3 business days for your refill to be completed.          Additional Information About Your Visit        Care EveryWhere ID     This is your Care EveryWhere ID. This could be used by other organizations to access your [employer] medical records  [id number]         Blood Pressure from Last 3 Encounters:   09/19/18 122/64   08/31/18 96/60   11/27/17 102/60    Weight from Last 3 Encounters:   09/19/18 141 lb (64 kg)   08/31/18 137 lb (62.1 kg)   11/27/17 131 lb (59.4 kg)              We Performed the Following     FLU VACCINE, SPLIT VIRUS, IM (QUADRIVALENT) [72919]- >3 YRS     Vaccine Administration, Initial [00346]        Primary Care Provider Office Phone # Fax #    Radha Trent PA-C [phone] [address] [place] [address]        Equal Access to Services     LES WHITE : pelon Suárez,  bradley princessnatalee alexkatie branch ah. So [facility] [phone].    ATENCIÓN: Si prince jaramillo, tiene a woods disposición servicios gratuitos de asistencia lingüística. Jennifer al [phone].    We comply with applicable federal civil rights laws and [place] laws. We do not discriminate on the basis of race, color, national origin, age, disability, sex, sexual orientation, or gender identity.            Thank you!     Thank you for choosing [facility]  for your care. Our goal is always to provide you with excellent care. Hearing back from our patients is one way we can continue to improve our services. Please take a few minutes to complete the written survey that you may receive in the mail after your visit with us. Thank you!             Your Updated Medication List - Protect others around you: Learn how to safely use, store and throw away your medicines at www.disposemymeds.org.          This list is accurate as of 11/16/18  4:07 PM.  Always use your most recent med list.                   Brand Name Dispense Instructions for use Diagnosis    * nicotine 14 MG/24HR 24 hr patch    NICODERM CQ    30 patch    Place 1 patch onto the skin every 24 hours    Encounter for tobacco use cessation counseling       * nicotine 7 MG/24HR 24 hr patch    NICODERM CQ    30 patch    Place 1 patch onto the skin every 24 hours    Encounter for tobacco use cessation counseling       triamcinolone 0.5 % cream    KENALOG    30 g    Apply sparingly to affected area three times daily.    Dermatitis       * Notice:  This list has 2 medication(s) that are the same as other medications prescribed for you. Read the directions carefully, and ask your doctor or other care provider to review them with you.

## 2019-05-10 NOTE — PATIENT INSTRUCTIONS
Call [phone] to schedule mammogram.        Preventive Health Recommendations  Female Ages 40 to 49    Yearly exam:     See your health care provider every year in order to  1. Review health changes.   2. Discuss preventive care.    3. Review your medicines if your doctor prescribed any.      Get a Pap test every three years (unless you have an abnormal result and your provider advises testing more often).      If you get Pap tests with HPV test, you only need to test every 5 years, unless you have an abnormal result. You do not need a Pap test if your uterus was removed (hysterectomy) and you have not had cancer.      You should be tested each year for STDs (sexually transmitted diseases), if you're at risk.     Ask your doctor if you should have a mammogram.      Have a colonoscopy (test for colon cancer) if someone in your family has had colon cancer or polyps before age 50.       Have a cholesterol test every 5 years.       Have a diabetes test (fasting glucose) after age 45. If you are at risk for diabetes, you should have this test every 3 years.    Shots: Get a flu shot each year. Get a tetanus shot every 10 years.     Nutrition:     Eat at least 5 servings of fruits and vegetables each day.    Eat whole-grain bread, whole-wheat pasta and brown rice instead of white grains and rice.    Get adequate Calcium and Vitamin D.      Lifestyle    Exercise at least 150 minutes a week (an average of 30 minutes a day, 5 days a week). This will help you control your weight and prevent disease.    Limit alcohol to one drink per day.    No smoking.     Wear sunscreen to prevent skin cancer.    See your dentist every six months for an exam and cleaning.

## 2019-05-10 NOTE — PROGRESS NOTES
SUBJECTIVE:   CC: Sharon Caraballo is an 38 year old woman who presents for preventive health visit.     Healthy Habits:     Getting at least 3 servings of Calcium per day:  NO    Bi-annual eye exam:  Yes    Dental care twice a year:  Yes    Sleep apnea or symptoms of sleep apnea:  None    Diet:  Regular (no restrictions)    Frequency of exercise:  1 day/week    Duration of exercise:  Other    Taking medications regularly:  Yes    Medication side effects:  Not applicable    PHQ-2 Total Score: 0    Additional concerns today:  Yes          Pt states that the pad of her right foot has been painful for the past few months. She has been rolling a tennis ball on the bottom of her foot and states that it is still painful.   Pt states that she still has the sun spot on her nose that she would like to checked. No changes since last visit. Hasn't increased in size and no changes in color.   Pt would like fasting labs.     Today's PHQ-2 Score:   PHQ-2 (  [employer]) 2019   Q1: Little interest or pleasure in doing things 0   Q2: Feeling down, depressed or hopeless 0   PHQ-2 Score 0   Q1: Little interest or pleasure in doing things Not at all   Q2: Feeling down, depressed or hopeless Not at all   PHQ-2 Score 0       Abuse: Current or Past(Physical, Sexual or Emotional)- No  Do you feel safe in your environment? Yes    Social History     Tobacco Use     Smoking status: Current Every Day Smoker     Packs/day: 0.50     Years: 10.00     Pack years: 5.00     Types: Cigarettes     Last attempt to quit: 2017     Years since quittin.2     Smokeless tobacco: Never Used     Tobacco comment: 4-5 cigs per day   Substance Use Topics     Alcohol use: No     Comment: rare         Alcohol Use 2017   Prescreen: >3 drinks/day or >7 drinks/week? The patient does not drink >3 drinks per day nor >7 drinks per week.   No flowsheet data found.    Reviewed orders with patient.  Reviewed health maintenance and updated orders  accordingly - Yes  Lab work is in process  Labs reviewed in [facility]    Mammogram Screening: Patient with mother with breast cancer at age 60.  We did decide to go ahead with alla at age 38.        Pertinent mammograms are reviewed under the imaging tab.  History of abnormal Pap smear: NO - age 30-65 PAP every 5 years with negative HPV co-testing recommended  PAP / HPV Latest Ref Rng & Units 11/27/2017 7/18/2014   PAP - NIL NIL   HPV 16 DNA NEG:Negative Negative -   HPV 18 DNA NEG:Negative Negative -   OTHER HR HPV NEG:Negative Negative -     Reviewed and updated as needed this visit by clinical staff         Reviewed and updated as needed this visit by Provider        Past Medical History:   Diagnosis Date     Female infertility of unspecified origin 8/28/2014     GDM, class A1 3/23/2015     Lump or mass in breast      Miscarriage     x3     Septate uterus 8/28/2014     Tubal pregnancy 2004      Past Surgical History:   Procedure Laterality Date     ABDOMEN SURGERY      tubal pregnancy, gullbladder     CHOLECYSTECTOMY  1999    laparoscopic     COLONOSCOPY       DILATION AND CURETTAGE  2005    at the time of her tubal pregnancy     GYN SURGERY Right 2005    right tubal pregnancy       Review of Systems  CONSTITUTIONAL: NEGATIVE for fever, chills, change in weight  INTEGUMENTARU/SKIN: NEGATIVE for worrisome rashes, moles or lesions  EYES: NEGATIVE for vision changes or irritation  ENT: NEGATIVE for ear, mouth and throat problems  RESP: NEGATIVE for significant cough or SOB  BREAST: NEGATIVE for masses, tenderness or discharge  CV: NEGATIVE for chest pain, palpitations or peripheral edema  GI: NEGATIVE for nausea, abdominal pain, heartburn, or change in bowel habits  : NEGATIVE for unusual urinary or vaginal symptoms. Periods are regular.  MUSCULOSKELETAL: NEGATIVE for significant arthralgias or myalgia  NEURO: NEGATIVE for weakness, dizziness or paresthesias  PSYCHIATRIC: NEGATIVE for changes in mood or affect      OBJECTIVE:   There were no vitals taken for this visit.  Physical Exam  GENERAL: healthy, alert and no distress  EYES: Eyes grossly normal to inspection, PERRL and conjunctivae and sclerae normal  HENT: ear canals and TM's normal, nose and mouth without ulcers or lesions  NECK: no adenopathy, no asymmetry, masses, or scars and thyroid normal to palpation  RESP: lungs clear to auscultation - no rales, rhonchi or wheezes  BREAST: normal without masses, tenderness or nipple discharge and no palpable axillary masses or adenopathy  CV: regular rate and rhythm, normal S1 S2, no S3 or S4, no murmur, click or rub, no peripheral edema and peripheral pulses strong  ABDOMEN: soft, nontender, no hepatosplenomegaly, no masses and bowel sounds normal  MS: no gross musculoskeletal defects noted, no edema  SKIN: no suspicious lesions or rashes  NEURO: Normal strength and tone, mentation intact and speech normal  PSYCH: mentation appears normal, affect normal/bright  Right foot with small 8mm round semi mobile lesion in between 2,3 metatarsal base.      Diagnostic Test Results:  No results found for this or any previous visit (from the past 24 hour(s)).    ASSESSMENT/PLAN:   1. Routine history and physical examination of adult  Recommend yearly physicals.      2. Tobacco use disorder  Declines assistance with cessation today.  We did discuss chantix today and she could send me a message if she would like to start that.  Did not tolerate welbutrin.   - TOBACCO CESSATION ORDER FOR     3. Special screening examination for neoplasm of breast  Mom with breast cancer in her 60s.  Start mammo at 38 dense breasts so will order mk  - MA Screen Bilateral w/Mk; Future    4. Lipid screening  screen  - Lipid panel reflex to direct LDL Fasting    5. Screening for diabetes mellitus  screen  - Glucose    6. Right foot pain  Suspect frias's neuroma or cyst- will send to Dr. Zhu.    - PODIATRY/FOOT & ANKLE SURGERY  "REFERRAL    COUNSELING:  Reviewed preventive health counseling, as reflected in patient instructions    Estimated body mass index is 24.98 kg/m  as calculated from the following:    Height as of 8/31/18: 1.6 m (5' 2.99\").    Weight as of 9/19/18: 64 kg (141 lb).    Weight management plan: Discussed healthy diet and exercise guidelines     reports that she has been smoking cigarettes.  She has a 5.00 pack-year smoking history. She has never used smokeless tobacco.  Tobacco Cessation Action Plan: Information offered: Patient not interested at this time    Counseling Resources:  ATP IV Guidelines  Pooled Cohorts Equation Calculator  Breast Cancer Risk Calculator  FRAX Risk Assessment  ICSI Preventive Guidelines  Dietary Guidelines for Americans, 2010  USDA's MyPlate  ASA Prophylaxis  Lung CA Screening    Neda Mancilla NP  [facility]  "

## 2019-05-17 ENCOUNTER — OFFICE VISIT (OUTPATIENT)
Dept: FAMILY MEDICINE | Facility: OTHER | Age: 39
End: 2019-05-17
Payer: COMMERCIAL

## 2019-05-17 VITALS
RESPIRATION RATE: 14 BRPM | TEMPERATURE: 97.7 F | BODY MASS INDEX: 26.22 KG/M2 | WEIGHT: 148 LBS | SYSTOLIC BLOOD PRESSURE: 110 MMHG | HEART RATE: 98 BPM | DIASTOLIC BLOOD PRESSURE: 70 MMHG | HEIGHT: 63 IN

## 2019-05-17 DIAGNOSIS — Z13.1 SCREENING FOR DIABETES MELLITUS: ICD-10-CM

## 2019-05-17 DIAGNOSIS — Z13.220 LIPID SCREENING: ICD-10-CM

## 2019-05-17 DIAGNOSIS — Z12.39 SPECIAL SCREENING EXAMINATION FOR NEOPLASM OF BREAST: ICD-10-CM

## 2019-05-17 DIAGNOSIS — Z00.00 ROUTINE HISTORY AND PHYSICAL EXAMINATION OF ADULT: Primary | ICD-10-CM

## 2019-05-17 DIAGNOSIS — F17.200 TOBACCO USE DISORDER: ICD-10-CM

## 2019-05-17 DIAGNOSIS — M79.671 RIGHT FOOT PAIN: ICD-10-CM

## 2019-05-17 LAB
CHOLEST SERPL-MCNC: 199 MG/DL
GLUCOSE SERPL-MCNC: 87 MG/DL (ref 70–99)
HDLC SERPL-MCNC: 58 MG/DL
LDLC SERPL CALC-MCNC: 123 MG/DL
NONHDLC SERPL-MCNC: 141 MG/DL
TRIGL SERPL-MCNC: 92 MG/DL

## 2019-05-17 PROCEDURE — 36415 COLL VENOUS BLD VENIPUNCTURE: CPT | Performed by: NURSE PRACTITIONER

## 2019-05-17 PROCEDURE — 82947 ASSAY GLUCOSE BLOOD QUANT: CPT | Performed by: NURSE PRACTITIONER

## 2019-05-17 PROCEDURE — 99213 OFFICE O/P EST LOW 20 MIN: CPT | Mod: 25 | Performed by: NURSE PRACTITIONER

## 2019-05-17 PROCEDURE — 99396 PREV VISIT EST AGE 40-64: CPT | Performed by: NURSE PRACTITIONER

## 2019-05-17 PROCEDURE — 80061 LIPID PANEL: CPT | Performed by: NURSE PRACTITIONER

## 2019-05-17 ASSESSMENT — ENCOUNTER SYMPTOMS
DIZZINESS: 1
DIARRHEA: 0
WEAKNESS: 0
HEARTBURN: 1
MYALGIAS: 0
NERVOUS/ANXIOUS: 0
SORE THROAT: 0
SHORTNESS OF BREATH: 0
HEADACHES: 0
EYE PAIN: 0
HEMATURIA: 0
FEVER: 0
ARTHRALGIAS: 1
HEMATOCHEZIA: 0
FREQUENCY: 0
NAUSEA: 0
ABDOMINAL PAIN: 0
CHILLS: 0
CONSTIPATION: 0
COUGH: 0
PARESTHESIAS: 0
PALPITATIONS: 0
DYSURIA: 0
BREAST MASS: 0

## 2019-05-17 ASSESSMENT — MIFFLIN-ST. JEOR: SCORE: 1310.45

## 2019-05-17 ASSESSMENT — PAIN SCALES - GENERAL: PAINLEVEL: MODERATE PAIN (4)

## 2019-05-28 ENCOUNTER — ANCILLARY PROCEDURE (OUTPATIENT)
Dept: GENERAL RADIOLOGY | Facility: OTHER | Age: 39
End: 2019-05-28
Attending: PODIATRIST
Payer: COMMERCIAL

## 2019-05-28 ENCOUNTER — OFFICE VISIT (OUTPATIENT)
Dept: PODIATRY | Facility: OTHER | Age: 39
End: 2019-05-28
Attending: NURSE PRACTITIONER
Payer: COMMERCIAL

## 2019-05-28 VITALS
WEIGHT: 148 LBS | BODY MASS INDEX: 26.22 KG/M2 | HEIGHT: 63 IN | SYSTOLIC BLOOD PRESSURE: 110 MMHG | DIASTOLIC BLOOD PRESSURE: 62 MMHG

## 2019-05-28 DIAGNOSIS — Q66.6 PES VALGUS: Primary | ICD-10-CM

## 2019-05-28 DIAGNOSIS — M79.671 RIGHT FOOT PAIN: ICD-10-CM

## 2019-05-28 DIAGNOSIS — M77.8 CAPSULITIS OF RIGHT FOOT: ICD-10-CM

## 2019-05-28 PROCEDURE — 99243 OFF/OP CNSLTJ NEW/EST LOW 30: CPT | Performed by: PODIATRIST

## 2019-05-28 PROCEDURE — 73630 X-RAY EXAM OF FOOT: CPT | Mod: RT

## 2019-05-28 ASSESSMENT — MIFFLIN-ST. JEOR: SCORE: 1310.45

## 2019-05-28 ASSESSMENT — PAIN SCALES - GENERAL: PAINLEVEL: MODERATE PAIN (4)

## 2019-05-28 NOTE — LETTER
5/28/2019         RE: Sharon Caraballo  [address] [facility] [address]        Dear Colleague,    Thank you for referring your patient, Sharon Caraballo, to the [facility]. Please see a copy of my visit note below.    HPI:  Sharon Caraballo is a 38 year old female who is seen in consultation at the request of Neda Mancilla NP    Pt presents for eval of:   (Onset, Location, L/R, Character, Treatments, Injury if yes)    XR Right foot today 5/28/2019     Onset Summer 2018, plantar ball of Right foot pain with callus. No injury noted. Presents today with casual unsupportive tie shoes.  Fractured Right foot age 6.  Constant, dull ache,   Intermittent, sharp, shooting, numbness in heel, feels like walking on a pebble, pain 4-8  Wearing high heels less frequently, minimum barefoot walking    Works as a Accounts Payable.    Weight management plan: Patient was referred to their PCP to discuss a diet and exercise plan.     Patient to follow up with Primary Care provider regarding elevated blood pressure.    ROS:  10 point ROS neg other than the symptoms noted above in the HPI.    Patient Active Problem List   Diagnosis     Septate uterus     Female infertility     Lump or mass in breast     Tobacco dependence syndrome       PAST MEDICAL HISTORY:   Past Medical History:   Diagnosis Date     Female infertility of unspecified origin 8/28/2014     GDM, class A1 3/23/2015     Lump or mass in breast      Miscarriage     x3     Septate uterus 8/28/2014     Tubal pregnancy 2004        PAST SURGICAL HISTORY:   Past Surgical History:   Procedure Laterality Date     ABDOMEN SURGERY      tubal pregnancy, gullbladder     CHOLECYSTECTOMY  1999    laparoscopic     COLONOSCOPY       DILATION AND CURETTAGE  2005    at the time of her tubal pregnancy     GYN SURGERY Right 2005    right tubal pregnancy        MEDICATIONS:   Current Outpatient Medications:      nicotine (NICODERM CQ) 14 MG/24HR 24 hr  patch, Place 1 patch onto the skin every 24 hours (Patient not taking: Reported on 2018), Disp: 30 patch, Rfl: 0     nicotine (NICODERM CQ) 7 MG/24HR 24 hr patch, Place 1 patch onto the skin every 24 hours (Patient not taking: Reported on 2018), Disp: 30 patch, Rfl: 0     triamcinolone (KENALOG) 0.5 % cream, Apply sparingly to affected area three times daily. (Patient not taking: Reported on 2019), Disp: 30 g, Rfl: 0     ALLERGIES:    Allergies   Allergen Reactions     Penicillins Itching     Wellbutrin [Bupropion] Nausea and Vomiting        SOCIAL HISTORY:   Social History     Socioeconomic History     Marital status: Single     Spouse name: Everette Caraballo     Number of children: 1     Years of education: Not on file     Highest education level: Not on file   Occupational History     Occupation: Accounts Payable     Employer: [employer]   Social Needs     Financial resource strain: Not on file     Food insecurity:     Worry: Not on file     Inability: Not on file     Transportation needs:     Medical: Not on file     Non-medical: Not on file   Tobacco Use     Smoking status: Current Every Day Smoker     Packs/day: 0.50     Years: 10.00     Pack years: 5.00     Types: Cigarettes     Last attempt to quit: 2017     Years since quittin.3     Smokeless tobacco: Never Used     Tobacco comment: 4-5 cigs per day   Substance and Sexual Activity     Alcohol use: No     Comment: rare     Drug use: No     Sexual activity: Yes     Partners: Male   Lifestyle     Physical activity:     Days per week: Not on file     Minutes per session: Not on file     Stress: Not on file   Relationships     Social connections:     Talks on phone: Not on file     Gets together: Not on file     Attends Orthodox service: Not on file     Active member of club or organization: Not on file     Attends meetings of clubs or organizations: Not on file     Relationship status: Not on file     Intimate partner violence:  "    Fear of current or ex partner: Not on file     Emotionally abused: Not on file     Physically abused: Not on file     Forced sexual activity: Not on file   Other Topics Concern     Parent/sibling w/ CABG, MI or angioplasty before 65F 55M? 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  Everette smokes.  Advised about secondhand smoke.  No concerns about domestic violence.  No indoor cats/kittens.        FAMILY HISTORY:   Family History   Problem Relation Age of Onset     Heart Disease Mother 53        MI, s/p stents and defribrillator     Breast Cancer Mother      Arthritis Sister      Diabetes Sister         adult-onset     Obesity Sister      Diabetes Paternal Grandmother      Heart Disease Paternal Grandmother      Cerebrovascular Disease Paternal Grandfather         EXAM:Vitals: /62   Ht 1.6 m (5' 3\")   Wt 67.1 kg (148 lb)   BMI 26.22 kg/m     BMI= Body mass index is 26.22 kg/m .    General appearance: Patient is alert and fully cooperative with history & exam.  No sign of distress is noted during the visit.     Psychiatric: Affect is pleasant & appropriate.  Patient appears motivated to improve health.     Respiratory: Breathing is regular & unlabored while sitting.     HEENT: Hearing is intact to spoken word.  Speech is clear.  No gross evidence of visual impairment that would impact ambulation.     Vascular: DP & PT pulses are intact & regular bilaterally.  No significant edema or varicosities noted.  CFT and skin temperature is normal to both " lower extremities.     Neurologic: Lower extremity sensation is intact to light touch.  No evidence of weakness or contracture in the lower extremities.  No evidence of neuropathy.    Dermatologic: Skin is intact to both lower extremities with adequate texture, turgor and tone about the integument.  No paronychia or evidence of soft tissue infection is noted.     Musculoskeletal: Patient is ambulatory without assistive device or brace.  Valgus noted with symptoms noted about the plantar right second metatarsal phalangeal joint.  Negative drawer maneuver of the second MPJ equal bilateral.  No medial or lateral deviation of the joint.  Pain is noted with firm palpation about the plantar second metatarsal head and plantar plate.  Subtle induration is noted with palpation.    Radiographs: 3 views of the right foot demonstrate no acute cortical reaction.  Mild forefoot valgus with abnormal metatarsal length parabola.  No acute cortical reaction or joint diastases.     ASSESSMENT:       ICD-10-CM    1. Pes valgus Q66.6 ORTHOTICS REFERRAL   2. Capsulitis of right foot M77.51 ORTHOTICS REFERRAL        PLAN:  Reviewed patient's chart in [facility].      5/28/2019   Obtained interpreted radiographs  Discussed mechanical loading of the joint  Reviewed appropriate shoe gear written instructions dispensed  Should obtain more cushion through the forefoot and sturdy shank to redistribute load throughout the foot more equally  Order for custom molded orthotics to provide more arch support and reduce load about the forefoot and reduce the compression about the second metatarsal phalangeal joint through loading.  To follow-up after utilizing the orthotics for some time.  Discussed oral anti-inflammatory and she refuses that at this time due to symptoms 3/10.    John Hoover DPM      Again, thank you for allowing me to participate in the care of your patient.        Sincerely,        John Hoover DPM

## 2019-05-28 NOTE — PATIENT INSTRUCTIONS
Reliable shoe stores: To maximize your experience and provide the best possible fit.  Be sure to show them your foot concerns and tell them Dr. Hoover sent you.      Stores listed in bold have only athletic shoes, and stores that are not bold are mostly casual or variety of shoes    [employer]  [address] [place] [address]  [phone]    [employer] [place]  [address] [place] [address]  [phone]     [employer] [facility]ish[facility]  [address] [place] [address]  [phone]    [employer]  [address] [facility]  [place] [place] [facility] [address]  [phone]    [employer]  [address] [place], [place] [address] [phone]    [employer]  [address] [place] [address]  [phone]                         [employer] Locations:     [address] [place] [address]   [phone]     [address] [place] [address] [address] [place] [address]   [phone]     [address] [place] [address]   [phone]     [address] [facility] [place] [address]   [phone]     [address] [place] [address]   [phone]     [address] [place] [address]   [phone]     [address]   [place] [address]   [phone]     [address] [facility]   [place] [address]   [phone]             How to find reasonable shoes          The correct width    Correct Fitting    Correct Length      Foot Distortion    Posture Distortion                          Torsional Rigidity      Grasp behind the heel and underneath the foot and twist      Bad    Excessive torsion/twist in midfoot     Less torsion/twist in midfoot is better                   Heel Counter Rigidity      Grasp just above   midsole and squeeze      Bad    Soft heel counter      Good    Rigid Heel Counter      Flexion Rigidity      Grasp shoe and bend from forefoot to rearfoot

## 2019-05-28 NOTE — PROGRESS NOTES
HPI:  Sharon Caraballo is a 38 year old female who is seen in consultation at the request of Neda Mancilla NP    Pt presents for eval of:   (Onset, Location, L/R, Character, Treatments, Injury if yes)    XR Right foot today 5/28/2019     Onset Summer 2018, plantar ball of Right foot pain with callus. No injury noted. Presents today with casual unsupportive tie shoes.  Fractured Right foot age 6.  Constant, dull ache,   Intermittent, sharp, shooting, numbness in heel, feels like walking on a pebble, pain 4-8  Wearing high heels less frequently, minimum barefoot walking    Works as a Accounts Payable.    Weight management plan: Patient was referred to their PCP to discuss a diet and exercise plan.     Patient to follow up with Primary Care provider regarding elevated blood pressure.    ROS:  10 point ROS neg other than the symptoms noted above in the HPI.    Patient Active Problem List   Diagnosis     Septate uterus     Female infertility     Lump or mass in breast     Tobacco dependence syndrome       PAST MEDICAL HISTORY:   Past Medical History:   Diagnosis Date     Female infertility of unspecified origin 8/28/2014     GDM, class A1 3/23/2015     Lump or mass in breast      Miscarriage     x3     Septate uterus 8/28/2014     Tubal pregnancy 2004        PAST SURGICAL HISTORY:   Past Surgical History:   Procedure Laterality Date     ABDOMEN SURGERY      tubal pregnancy, gullbladder     CHOLECYSTECTOMY  1999    laparoscopic     COLONOSCOPY       DILATION AND CURETTAGE  2005    at the time of her tubal pregnancy     GYN SURGERY Right 2005    right tubal pregnancy        MEDICATIONS:   Current Outpatient Medications:      nicotine (NICODERM CQ) 14 MG/24HR 24 hr patch, Place 1 patch onto the skin every 24 hours (Patient not taking: Reported on 8/31/2018), Disp: 30 patch, Rfl: 0     nicotine (NICODERM CQ) 7 MG/24HR 24 hr patch, Place 1 patch onto the skin every 24 hours (Patient not taking: Reported on  2018), Disp: 30 patch, Rfl: 0     triamcinolone (KENALOG) 0.5 % cream, Apply sparingly to affected area three times daily. (Patient not taking: Reported on 2019), Disp: 30 g, Rfl: 0     ALLERGIES:    Allergies   Allergen Reactions     Penicillins Itching     Wellbutrin [Bupropion] Nausea and Vomiting        SOCIAL HISTORY:   Social History     Socioeconomic History     Marital status: Single     Spouse name: Everette Caraballo     Number of children: 1     Years of education: Not on file     Highest education level: Not on file   Occupational History     Occupation: Accounts Payable     Employer: [employer]   Social Needs     Financial resource strain: Not on file     Food insecurity:     Worry: Not on file     Inability: Not on file     Transportation needs:     Medical: Not on file     Non-medical: Not on file   Tobacco Use     Smoking status: Current Every Day Smoker     Packs/day: 0.50     Years: 10.00     Pack years: 5.00     Types: Cigarettes     Last attempt to quit: 2017     Years since quittin.3     Smokeless tobacco: Never Used     Tobacco comment: 4-5 cigs per day   Substance and Sexual Activity     Alcohol use: No     Comment: rare     Drug use: No     Sexual activity: Yes     Partners: Male   Lifestyle     Physical activity:     Days per week: Not on file     Minutes per session: Not on file     Stress: Not on file   Relationships     Social connections:     Talks on phone: Not on file     Gets together: Not on file     Attends Buddhism service: Not on file     Active member of club or organization: Not on file     Attends meetings of clubs or organizations: Not on file     Relationship status: Not on file     Intimate partner violence:     Fear of current or ex partner: Not on file     Emotionally abused: Not on file     Physically abused: Not on file     Forced sexual activity: Not on file   Other Topics Concern     Parent/sibling w/ CABG, MI or angioplasty before 65F 55M?  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  Everette smokes.  Advised about secondhand smoke.  No concerns about domestic violence.  No indoor cats/kittens.        FAMILY HISTORY:   Family History   Problem Relation Age of Onset     Heart Disease Mother 53        MI, s/p stents and defribrillator     Breast Cancer Mother      Arthritis Sister      Diabetes Sister         adult-onset     Obesity Sister      Diabetes Paternal Grandmother      Heart Disease Paternal Grandmother      Cerebrovascular Disease Paternal Grandfather         EXAM:Vitals: /62   Ht 1.6 m (5' 3\")   Wt 67.1 kg (148 lb)   BMI 26.22 kg/m    BMI= Body mass index is 26.22 kg/m .    General appearance: Patient is alert and fully cooperative with history & exam.  No sign of distress is noted during the visit.     Psychiatric: Affect is pleasant & appropriate.  Patient appears motivated to improve health.     Respiratory: Breathing is regular & unlabored while sitting.     HEENT: Hearing is intact to spoken word.  Speech is clear.  No gross evidence of visual impairment that would impact ambulation.     Vascular: DP & PT pulses are intact & regular bilaterally.  No significant edema or varicosities noted.  CFT and skin temperature is normal to both lower extremities.     Neurologic: Lower extremity sensation is intact to light touch.  No evidence of weakness or contracture in the lower extremities.  No evidence of neuropathy.    Dermatologic: Skin is intact to both lower extremities with " adequate texture, turgor and tone about the integument.  No paronychia or evidence of soft tissue infection is noted.     Musculoskeletal: Patient is ambulatory without assistive device or brace.  Valgus noted with symptoms noted about the plantar right second metatarsal phalangeal joint.  Negative drawer maneuver of the second MPJ equal bilateral.  No medial or lateral deviation of the joint.  Pain is noted with firm palpation about the plantar second metatarsal head and plantar plate.  Subtle induration is noted with palpation.    Radiographs: 3 views of the right foot demonstrate no acute cortical reaction.  Mild forefoot valgus with abnormal metatarsal length parabola.  No acute cortical reaction or joint diastases.     ASSESSMENT:       ICD-10-CM    1. Pes valgus Q66.6 ORTHOTICS REFERRAL   2. Capsulitis of right foot M77.51 ORTHOTICS REFERRAL        PLAN:  Reviewed patient's chart in [facility].      5/28/2019   Obtained interpreted radiographs  Discussed mechanical loading of the joint  Reviewed appropriate shoe gear written instructions dispensed  Should obtain more cushion through the forefoot and sturdy shank to redistribute load throughout the foot more equally  Order for custom molded orthotics to provide more arch support and reduce load about the forefoot and reduce the compression about the second metatarsal phalangeal joint through loading.  To follow-up after utilizing the orthotics for some time.  Discussed oral anti-inflammatory and she refuses that at this time due to symptoms 3/10.    John Hoover DPM

## 2019-10-04 ENCOUNTER — OFFICE VISIT (OUTPATIENT)
Dept: URGENT CARE | Facility: RETAIL CLINIC | Age: 39
End: 2019-10-04
Payer: COMMERCIAL

## 2019-10-04 VITALS
RESPIRATION RATE: 18 BRPM | SYSTOLIC BLOOD PRESSURE: 111 MMHG | HEART RATE: 108 BPM | OXYGEN SATURATION: 100 % | TEMPERATURE: 100.1 F | DIASTOLIC BLOOD PRESSURE: 73 MMHG

## 2019-10-04 DIAGNOSIS — J02.0 STREP THROAT: Primary | ICD-10-CM

## 2019-10-04 DIAGNOSIS — J02.9 ACUTE PHARYNGITIS, UNSPECIFIED ETIOLOGY: ICD-10-CM

## 2019-10-04 LAB — S PYO AG THROAT QL IA.RAPID: ABNORMAL

## 2019-10-04 PROCEDURE — 87880 STREP A ASSAY W/OPTIC: CPT | Mod: QW | Performed by: NURSE PRACTITIONER

## 2019-10-04 PROCEDURE — 99213 OFFICE O/P EST LOW 20 MIN: CPT | Performed by: NURSE PRACTITIONER

## 2019-10-04 RX ORDER — AZITHROMYCIN 250 MG/1
TABLET, FILM COATED ORAL
Qty: 6 TABLET | Refills: 0 | Status: SHIPPED | OUTPATIENT
Start: 2019-10-04 | End: 2020-04-02

## 2019-10-04 ASSESSMENT — ENCOUNTER SYMPTOMS
HEADACHES: 1
CHILLS: 0
NAUSEA: 0
FEVER: 1
ADENOPATHY: 0
MYALGIAS: 0
COUGH: 0
DIAPHORESIS: 0
VOICE CHANGE: 1
SORE THROAT: 1

## 2019-10-04 ASSESSMENT — PAIN SCALES - GENERAL: PAINLEVEL: EXTREME PAIN (9)

## 2019-10-04 NOTE — PROGRESS NOTES
Chief Complaint   Patient presents with     Pharyngitis     SUBJECTIVE:  Sharon Caraballo is a 38 year old female presenting with a chief complaint of a sore throat, fever, headache today.  Course of illness: sudden onset and worsening.  Severity: moderate  Treatment measures tried include: Tylenol/Ibuprofen.  Predisposing factors include: none    Past Medical History:   Diagnosis Date     Female infertility of unspecified origin 2014     GDM, class A1 3/23/2015     Lump or mass in breast      Miscarriage     x3     Septate uterus 2014     Tubal pregnancy 2004     No current outpatient medications on file prior to visit.  No current facility-administered medications on file prior to visit.     Social History     Tobacco Use     Smoking status: Current Every Day Smoker     Packs/day: 0.50     Years: 10.00     Pack years: 5.00     Types: Cigarettes     Last attempt to quit: 2017     Years since quittin.6     Smokeless tobacco: Never Used     Tobacco comment: 4-5 cigs per day   Substance Use Topics     Alcohol use: Yes     Comment: rare     Allergies   Allergen Reactions     Penicillins Itching     Wellbutrin [Bupropion] Nausea and Vomiting     Review of Systems   Constitutional: Positive for fever. Negative for chills and diaphoresis.   HENT: Positive for sore throat and voice change. Negative for congestion and ear pain.    Respiratory: Negative for cough.    Gastrointestinal: Negative for nausea.   Musculoskeletal: Negative for myalgias.   Skin: Negative for rash.   Neurological: Positive for headaches.   Hematological: Negative for adenopathy.       OBJECTIVE:   /73   Pulse 108   Temp 100.1  F (37.8  C) (Temporal)   Resp 18   LMP 2019 (Exact Date)   SpO2 100%   Breastfeeding? No      Physical Exam  Constitutional:       General: She is not in acute distress.     Appearance: She is well-developed. She is not diaphoretic.   HENT:      Head: Normocephalic and atraumatic.       "Mouth/Throat:      Pharynx: Oropharyngeal exudate (white patchy) and posterior oropharyngeal erythema (2+ tonsils) present.   Eyes:      Conjunctiva/sclera: Conjunctivae normal.      Pupils: Pupils are equal, round, and reactive to light.   Neck:      Musculoskeletal: Normal range of motion and neck supple.   Cardiovascular:      Rate and Rhythm: Normal rate.   Pulmonary:      Effort: Pulmonary effort is normal. No respiratory distress.   Musculoskeletal: Normal range of motion.   Lymphadenopathy:      Cervical: No cervical adenopathy.   Skin:     General: Skin is warm.      Capillary Refill: Capillary refill takes less than 2 seconds.      Findings: No rash.   Neurological:      Mental Status: She is alert and oriented to person, place, and time.   Psychiatric:         Mood and Affect: Mood normal.         Behavior: Behavior normal.       Rapid Strep test is positive    ASSESSMENT:    ICD-10-CM    1. Strep throat J02.0 azithromycin (ZITHROMAX) 250 MG tablet   2. Acute pharyngitis, unspecified etiology J02.9 RAPID STREP SCREEN     CANCELED: BETA STREP GROUP A R/O CULTURE     PLAN:   Patient Instructions   Antibiotics as directed.  Drink plenty of fluids and rest.  May use salt water gargles- about 8 oz warm water with about 1 teaspoon salt  Chloraseptic and Cepacol spray are over the counter medications that numb the throat.  Over the counter pain relievers such as tylenol or ibuprofen may be used as needed.   Honey lemon tea helps to soothe the throat. \"Throat Coat\" tea is soothing as well.  Change toothbrush after 24 hours of antibiotics (may soak in 3-6% hydrogen peroxide)  Will be contagious for 24 hours after starting antibiotic  May return to school//work/activities 24 hours after antibiotics are started.  Wash hands frequently and do not share beverages.  Please follow up with primary care provider if symptoms are not improving, worsening or new symptoms or for any adverse reactions to " medications.    Follow up with primary care provider with any problems, questions or concerns or if symptoms worsen or fail to improve. Patient agreed to plan and verbalized understanding.    CARMEN Fox-BC  [facility] [place]

## 2019-10-04 NOTE — PATIENT INSTRUCTIONS
"Antibiotics as directed.  Drink plenty of fluids and rest.  May use salt water gargles- about 8 oz warm water with about 1 teaspoon salt  Chloraseptic and Cepacol spray are over the counter medications that numb the throat.  Over the counter pain relievers such as tylenol or ibuprofen may be used as needed.   Honey lemon tea helps to soothe the throat. \"Throat Coat\" tea is soothing as well.  Change toothbrush after 24 hours of antibiotics (may soak in 3-6% hydrogen peroxide)  Will be contagious for 24 hours after starting antibiotic  May return to school//work/activities 24 hours after antibiotics are started.  Wash hands frequently and do not share beverages.  Please follow up with primary care provider if symptoms are not improving, worsening or new symptoms or for any adverse reactions to medications.  "

## 2019-12-06 ENCOUNTER — ALLIED HEALTH/NURSE VISIT (OUTPATIENT)
Dept: FAMILY MEDICINE | Facility: OTHER | Age: 39
End: 2019-12-06
Payer: COMMERCIAL

## 2019-12-06 DIAGNOSIS — Z23 NEED FOR PROPHYLACTIC VACCINATION AND INOCULATION AGAINST INFLUENZA: Primary | ICD-10-CM

## 2019-12-06 PROCEDURE — 99207 ZZC NO CHARGE NURSE ONLY: CPT

## 2019-12-06 PROCEDURE — 90686 IIV4 VACC NO PRSV 0.5 ML IM: CPT

## 2019-12-06 PROCEDURE — 90471 IMMUNIZATION ADMIN: CPT

## 2020-03-31 ENCOUNTER — MYC MEDICAL ADVICE (OUTPATIENT)
Dept: FAMILY MEDICINE | Facility: OTHER | Age: 40
End: 2020-03-31

## 2020-03-31 NOTE — PROGRESS NOTES
"Subjective     Sharon Caraballo is a 39 year old female who is being evaluated via a billable telephone visit.      The patient has been notified of following:     \"This telephone visit will be conducted via a call between you and your physician/provider. We have found that certain health care needs can be provided without the need for a physical exam.  This service lets us provide the care you need with a short phone conversation.  If a prescription is necessary we can send it directly to your pharmacy.  If lab work is needed we can place an order for that and you can then stop by our lab to have the test done at a later time.    If during the course of the call the physician/provider feels a telephone visit is not appropriate, you will not be charged for this service.\"     Patient has given verbal consent for Telephone visit?  Yes    Sharon Caraballo complains of   Chief Complaint   Patient presents with     Back Pain       ALLERGIES  Penicillins and Wellbutrin [bupropion]    Back Pain       Duration: 2 weeks        Specific cause: Was laying on the floor, tried getting up and was unable to get up. Also almost slipped on ice, did not fall but caught her fall and unsure if this did it as well    Description:   Location of pain: low back both  Character of pain: sharp, stabbing, cramping and constant  Pain radiation:radiates into the right buttocks, radiates into the right leg, radiates into the left buttocks and radiates into the left leg  New numbness or weakness in legs, not attributed to pain:  YES- mainly when driving or when sitting in chair too long    Intensity: Currently 6/10, At its worst 10/10, moderate    History:   Pain interferes with job: YES  History of back problems: no prior back problems  Any previous MRI or X-rays: None  Sees a specialist for back pain:  Has seen chiropractor in the past for mild back pain  Therapies tried without relief: heat    Alleviating factors:   Improved by: cold and " stretch      Precipitating factors:  Worsened by: Bending and Sitting for long periods of times.          Accompanying Signs & Symptoms:  Risk of Fracture:  None  Risk of Cauda Equina:  None  Risk of Infection:  None  Risk of Cancer:  None  Risk of Ankylosing Spondylitis:  Onset at age <35, male, AND morning back stiffness. no     her back when she was [age] years old.  Healed with chiriopractor.  When she had her daughter she had some pain.  [place]  [place]pped [place].  Got on floor later that night and felt she could not get up.  Painful to point of asking her  to help her.  Every day seems to get a little better.  When driving in car today- felt legs going numb- changed with position.  In car is most painful.  Is one hour to get into office.  When standing not horrible.  States stiff chair feels the best.        Patient Active Problem List   Diagnosis     Septate uterus     Female infertility     Lump or mass in breast     Tobacco dependence syndrome     Past Surgical History:   Procedure Laterality Date     ABDOMEN SURGERY      tubal pregnancy, gullbladder     CHOLECYSTECTOMY  1999    laparoscopic     COLONOSCOPY       DILATION AND CURETTAGE  2005    at the time of her tubal pregnancy     GYN SURGERY Right 2005    right tubal pregnancy       Social History     Tobacco Use     Smoking status: Current Every Day Smoker     Packs/day: 0.50     Years: 10.00     Pack years: 5.00     Types: Cigarettes     Last attempt to quit: 1/23/2017     Years since quitting: 3.1     Smokeless tobacco: Never Used     Tobacco comment: 4-5 cigs per day   Substance Use Topics     Alcohol use: Yes     Comment: rare     Family History   Problem Relation Age of Onset     Heart Disease Mother 53        MI, s/p stents and defribrillator     Breast Cancer Mother      Arthritis Sister      Diabetes Sister         adult-onset     Obesity Sister      Diabetes Paternal Grandmother      Heart Disease Paternal Grandmother       Cerebrovascular Disease Paternal Grandfather          Current Outpatient Medications   Medication Sig Dispense Refill     acetaminophen (TYLENOL) 500 MG tablet Take 1,000 mg by mouth every 6 hours as needed for mild pain       cyclobenzaprine (FLEXERIL) 10 MG tablet Take 0.5-1 tablets (5-10 mg) by mouth 2 times daily as needed for muscle spasms 20 tablet 0     methylPREDNISolone (MEDROL DOSEPAK) 4 MG tablet therapy pack Follow Package Directions 21 tablet 0     ibuprofen (ADVIL/MOTRIN) 200 MG tablet Take 600 mg by mouth every 4 hours as needed for mild pain       Allergies   Allergen Reactions     Penicillins Itching     Wellbutrin [Bupropion] Nausea and Vomiting       Reviewed and updated as needed this visit by Provider  Tobacco  Allergies  Meds  Problems  Med Hx  Surg Hx  Fam Hx         Review of Systems   ROS COMP: Constitutional, HEENT, cardiovascular, pulmonary, gi and gu systems are negative, except as otherwise noted.       Objective   Reported vitals:  There were no vitals taken for this visit.     Psych: Alert and oriented times 3; coherent speech, normal   rate and volume, able to articulate logical thoughts, able   to abstract reason, no tangential thoughts, no hallucinations   or delusions  Her affect is bright     Diagnostic Test Results:  Labs reviewed in Epic  none         Assessment/Plan:  1. Acute bilateral low back pain with bilateral sciatica  Will treat for low back pain with sciatica. Physical Therapy is not seeing patients at this time due to covid 19.  I am going to send her some stretches and exercises to work on at home.  Supportive cares discussed.  Follow up in clinic if no improvement, worsening symptoms, or concerns.   - methylPREDNISolone (MEDROL DOSEPAK) 4 MG tablet therapy pack; Follow Package Directions  Dispense: 21 tablet; Refill: 0  - cyclobenzaprine (FLEXERIL) 10 MG tablet; Take 0.5-1 tablets (5-10 mg) by mouth 2 times daily as needed for muscle spasms  Dispense: 20  tablet; Refill: 0    Return in about 3 months (around 7/2/2020) for Physical Exam.      Phone call duration:  9 minutes    Neda Mancilla NP

## 2020-03-31 NOTE — TELEPHONE ENCOUNTER
Spoke to patient, patient scheduled telephone visit for 4/2/2020    Next 5 appointments (look out 90 days)    Apr 02, 2020  3:30 PM CDT  Telephone Visit with Neda Mancilla NP  [facility] ([facility]) [address] [facility] [address] [phone]          Closing encounter.

## 2020-04-02 ENCOUNTER — VIRTUAL VISIT (OUTPATIENT)
Dept: FAMILY MEDICINE | Facility: OTHER | Age: 40
End: 2020-04-02
Payer: COMMERCIAL

## 2020-04-02 DIAGNOSIS — M54.41 ACUTE BILATERAL LOW BACK PAIN WITH BILATERAL SCIATICA: Primary | ICD-10-CM

## 2020-04-02 DIAGNOSIS — M54.42 ACUTE BILATERAL LOW BACK PAIN WITH BILATERAL SCIATICA: Primary | ICD-10-CM

## 2020-04-02 PROCEDURE — 99213 OFFICE O/P EST LOW 20 MIN: CPT | Mod: TEL | Performed by: NURSE PRACTITIONER

## 2020-04-02 RX ORDER — CYCLOBENZAPRINE HCL 10 MG
5-10 TABLET ORAL 2 TIMES DAILY PRN
Qty: 20 TABLET | Refills: 0 | Status: SHIPPED | OUTPATIENT
Start: 2020-04-02 | End: 2023-05-30

## 2020-04-02 RX ORDER — METHYLPREDNISOLONE 4 MG
TABLET, DOSE PACK ORAL
Qty: 21 TABLET | Refills: 0 | Status: SHIPPED | OUTPATIENT
Start: 2020-04-02 | End: 2023-05-30

## 2020-04-02 RX ORDER — IBUPROFEN 200 MG
600 TABLET ORAL EVERY 4 HOURS PRN
COMMUNITY
End: 2023-05-30

## 2020-04-02 RX ORDER — ACETAMINOPHEN 500 MG
1000 TABLET ORAL EVERY 6 HOURS PRN
COMMUNITY
End: 2023-05-30

## 2020-04-02 NOTE — PATIENT INSTRUCTIONS
Patient Education     Possible Causes of Low Back or Leg Pain    BIG: The symptoms in your back or leg may be due to pressure on a nerve. This pressure may be caused by a damaged disk or by abnormal bone growth. Either way, you may feel pain, burning, tingling, or numbness. If you have pressure on a nerve that connects to the sciatic nerve, pain may shoot down your leg.    Pressure from the disk  Constant wear and tear can weaken a disk over time and cause back pain. The disk can then be damaged by a sudden movement or injury. If its soft center starts to bulge, the disk may press on a nerve. Or the outside of the disk may tear, and the soft center may squeeze through and pinch a nerve.    Pressure from bone  As a disk wears out, the vertebrae right above and below the disk start to touch. This can put pressure on a nerve. Often, abnormal bone (called bone spurs) grows where the vertebrae rub against each other. This can cause the foramen or the spinal canal to narrow (called stenosis) and press against a nerve.  Date Last Reviewed: 3/1/2018    3107-2089 [employer]. [address] [place] [address]. All rights reserved. This information is not intended as a substitute for professional medical care. Always follow your healthcare professional's instructions.           Patient Education     Exercises to Strengthen Your Lower Back  Strong lower back and abdominal muscles work together to support your spine. The exercises below will help strengthen the lower back. It is important that you begin exercising slowly and increase levels gradually.  Always begin any exercise program with stretching. If you feel pain while doing any of these exercises, stop and talk to your doctor about a more specific exercise program that better suits your condition.   Low back stretch  The point of stretching is to make you more flexible and increase your range of motion. Stretch only as much as you are able.  Stretch slowly. Do not push your stretch to the limit. If at any point you feel pain while stretching, this is your (temporary) limit.    Lie on your back with your knees bent and both feet on the ground.    Slowly raise your left knee to your chest as you flatten your lower back against the floor. Hold for 5 seconds.    Relax and repeat the exercise with your right knee.    Do 10 of these exercises for each leg.    Repeat hugging both knees to your chest at the same time.  Building lower back strength  Start your exercise routine with 10 to 30 minutes a day, 1 to 3 times a day.  Initial exercises  Lying on your back:  1. Ankle pumps: Move your foot up and down, towards your head, and then away. Repeat 10 times with each foot.  2. Heel slides: Slowly bend your knee, drawing the heel of your foot towards you. Then slide your heel/foot from you, straightening your knee. Do not lift your foot off the floor (this is not a leg lift).  3. Abdominal contraction: Bend your knees and put your hands on your stomach. Tighten your stomach muscles. Hold for 5 seconds, then relax. Repeat 10 times.  4. Straight leg raise: Bend one leg at the knee and keep the other leg straight. Tighten your stomach muscles. Slowly lift your straight leg 6 to 12 inches off the floor and hold for up to 5 seconds. Repeat 10 times on each side.  Standin. Wall squats: Stand with your back against the wall. Move your feet about 12 inches away from the wall. Tighten your stomach muscles, and slowly bend your knees until they are at about a 45 degree angle. Do not go down too far. Hold about 5 seconds. Then slowly return to your starting position. Repeat 10 times.  2. Heel raises: Stand facing the wall. Slowly raise the heels of your feet up and down, while keeping your toes on the floor. If you have trouble balancing, you can touch the wall with your hands. Repeat 10 times.  More advanced exercises  When you feel comfortable enough, try these  exercises.  1. Kneeling lumbar extension: Begin on your hands and knees. At the same time, raise and straighten your right arm and left leg until they are parallel to the ground. Hold for 2 seconds and come back slowly to a starting position. Repeat with left arm and right leg, alternating 10 times.  2. Prone lumbar extension: Lie face down, arms extended overhead, palms on the floor. At the same time, raise your right arm and left leg as high as comfortably possible. Hold for 10 seconds and slowly return to start. Repeat with left arm and right leg, alternating 10 times. Gradually build up to 20 times. (Advanced: Repeat this exercise raising both arms and both legs a few inches off the floor at the same time. Hold for 5 seconds and release.)  3. Pelvic tilt: Lie on the floor on your back with your knees bent at 90 degrees. Your feet should be flat on the floor. Inhale, exhale, then slowly contract your abdominal muscles bringing your navel toward your spine. Let your pelvis rock back until your lower back is flat on the floor. Hold for 10 seconds while breathing smoothly.  4. Abdominal crunch: Perform a pelvic tilt (above) flattening your lower back against the floor. Holding the tension in your abdominal muscles, take another breath and raise your shoulder blades off the ground (this is not a full sit-up). Keep your head in line with your body (don t bend your neck forward). Hold for 2 seconds, then slowly lower.  Date Last Reviewed: 6/1/2016 2000-2019 [employer]. [address] [place] [address], [place] [id number]. All rights reserved. This information is not intended as a substitute for professional medical care. Always follow your healthcare professional's instructions.           Patient Education     Relieving Back Pain  Back pain is a common problem. You can strain back muscles by lifting too much weight or just by moving the wrong way. Back strain can be uncomfortable, even painful. And it can take  weeks or months to improve. To help yourself feel better and prevent future back strains, try these tips.  Important: Don't give aspirin to children or teens without first discussing it with your child's healthcare provider.  Ice    Ice reduces muscle pain and swelling. It helps most during the first 24 to 48 hours after an injury.    Wrap an ice pack or a bag of frozen peas in a thin towel. Never put ice directly on your skin.    Place the ice where your back hurts the most.    Don t ice for more than 20 minutes at a time.    You can use ice several times a day.  Medicines  Over-the-counter pain relievers include acetaminophen and anti-inflammatory medicines, which includes aspirin, naproxen, or ibuprofen. They can help ease discomfort. Some also reduce swelling.    Tell your healthcare provider about any medicines you are already taking.    Take medicines only as directed.  Manipulation and massage  Having manipulation by an osteopathic doctor or chiropractor may be helpful. Getting a massage also may help.   Heat  After the first 48 hours, heat can relax sore muscles and improve blood flow.    Try a warm bath or shower. Or use a heating pad set on low. To prevent a burn, keep a cloth between you and the heating pad.    Don t use a heating pad for more than 15 minutes at a time. Never sleep on a heating pad.  Date Last Reviewed: 6/1/2018 2000-2019 [employer]. [address] [place] [id number]. All rights reserved. This information is not intended as a substitute for professional medical care. Always follow your healthcare professional's instructions.           Patient Education     Caring for Your Back Throughout the Day    Take care of your back throughout the day. You will likely have fewer back problems if you do. Try to warm up before you move. Shift positions often. Also do your best to form healthy habits.  Warm up for the day  Do a few slow, catlike stretches before starting your  day. This simple warmup can soften your disks, stretch your back muscles, and help prevent injuries.  Shift positions often  At work and at home, change positions often. This helps keep your body from getting stiff. Stand up or lean back while you sit. If you can, get up and move every 1/2 hour.  Form healthy habits  Here are some suggestions:     Keep a healthy weight. When you weigh too much, your back is under excess strain. But losing just a few extra pounds can help a lot.    Try not to overeat. Learn about serving sizes. The size of a serving depends on the food and the food group. Many foods list serving sizes on the labels.    Handle minor aches with cold and heat. Apply cold the first 24 to 48 hours. Use heat after that. Always place a thin cloth between your skin and the source of cold or heat.    Take medicines as directed. This helps keep pain under control. Always read labels, and call your healthcare provider or pharmacist if you have any questions.  Walk each day  A daily walk keeps your back and thigh muscles stretched and strong. This gives your back better support. Be sure to walk with your spine s three curves aligned, by keeping your head, hips, and toes connected by a vertical line.  Date Last Reviewed: 5/1/2018 2000-2019 [employer]. [address] [facility], [place] [address]. All rights reserved. This information is not intended as a substitute for professional medical care. Always follow your healthcare professional's instructions.

## 2020-04-08 ENCOUNTER — MYC MEDICAL ADVICE (OUTPATIENT)
Dept: FAMILY MEDICINE | Facility: OTHER | Age: 40
End: 2020-04-08

## 2020-04-09 ENCOUNTER — MYC MEDICAL ADVICE (OUTPATIENT)
Dept: FAMILY MEDICINE | Facility: OTHER | Age: 40
End: 2020-04-09

## 2020-04-09 DIAGNOSIS — Z71.6 ENCOUNTER FOR TOBACCO USE CESSATION COUNSELING: Primary | ICD-10-CM

## 2020-04-09 RX ORDER — NICOTINE 21 MG/24HR
1 PATCH, TRANSDERMAL 24 HOURS TRANSDERMAL EVERY 24 HOURS
Qty: 28 PATCH | Refills: 4 | Status: SHIPPED | OUTPATIENT
Start: 2020-04-09 | End: 2023-05-30

## 2020-08-18 ENCOUNTER — VIRTUAL VISIT (OUTPATIENT)
Dept: FAMILY MEDICINE | Facility: OTHER | Age: 40
End: 2020-08-18
Payer: COMMERCIAL

## 2020-08-18 PROCEDURE — 99421 OL DIG E/M SVC 5-10 MIN: CPT | Performed by: INTERNAL MEDICINE

## 2020-08-18 NOTE — PROGRESS NOTES
"Date: 2020 15:57:27  Clinician: Edith Anderson  Clinician NPI: [id number]  Patient: Sharon Caraballo  Patient : 1980  Patient Address: [address] [place] [address]  Patient Phone: [phone]  Visit Protocol: URI  Patient Summary:  Sharon is a 39 year old ( : 1980 ) female who initiated a Visit for COVID-19 (Coronavirus) evaluation and screening. When asked the question \"Please sign me up to receive news, health information and promotions from [employer].\", Sharon responded \"No\".    Sharon states her symptoms started 1-2 days ago.   Her symptoms consist of a cough, nasal congestion, and facial pain or pressure.   Symptom details     Nasal secretions: The color of her mucus is yellow and green.    Cough: Sharon coughs a few times an hour and her cough is not more bothersome at night. Phlegm comes into her throat when she coughs. She believes her cough is caused by post-nasal drip. The color of the phlegm is green and yellow.     Facial pain or pressure: The facial pain or pressure does not feel worse when bending or leaning forward.      Sharon denies having ear pain, headache, chills, enlarged lymph nodes, nausea, sore throat, teeth pain, ageusia, diarrhea, vomiting, rhinitis, myalgias, anosmia, fever, wheezing, and malaise. She also denies having a sinus infection within the past year, taking antibiotic medication in the past month, and having recent facial or sinus surgery in the past 60 days. She is not experiencing dyspnea.   Precipitating events  She has not recently been exposed to someone with influenza. Sharon has not been in close contact with any high risk individuals.   Pertinent COVID-19 (Coronavirus) information  In the past 14 days, Sharon has not worked in a congregate living setting.   She does not work or volunteer as healthcare worker or a  and does not work or volunteer in a healthcare facility.   Sharon also has not lived in a " congregate living setting in the past 14 days. She does not live with a healthcare worker.   Sharon has not had a close contact with a laboratory-confirmed COVID-19 patient within 14 days of symptom onset.   Since December 2019, Sharon and has not had upper respiratory infection or influenza-like illness. Has not been diagnosed with lab-confirmed COVID-19 test   Pertinent medical history  Sharon does not get yeast infections when she takes antibiotics.   Sharon needs a return to work/school note.   Weight: 147 lbs   Sharon smokes or uses smokeless tobacco.   She denies pregnancy and denies breastfeeding. She has menstruated in the past month.   Weight: 147 lbs    MEDICATIONS: No current medications, ALLERGIES: Penicillins  Clinician Response:  Dear Sharon,   Your symptoms show that you may have coronavirus (COVID-19). This illness can cause fever, cough and trouble breathing. Many people get a mild case and get better on their own. Some people can get very sick.  What should I do?  We would like to test you for this virus.   1. Please call [phone] to schedule your visit. Explain that you were referred by [facility] to have a COVID-19 test. Be ready to share your [facility] visit ID number.  The following will serve as your written order for this COVID Test, ordered by me, for the indication of suspected COVID [Z20.828]: The test will be ordered in [employer], our electronic health record, after you are scheduled. It will show as ordered and authorized by Abraham Cunningham MD.  Order: COVID-19 (Coronavirus) PCR for SYMPTOMATIC testing from [facility].      2. When it's time for your COVID test:  Stay at least 6 feet away from others. (If someone will drive you to your test, stay in the backseat, as far away from the  as you can.)   Cover your mouth and nose with a mask, tissue or washcloth.  Go straight to the testing site. Don't make any stops on the way there or back.      3.Starting now: Stay home and away from  "others (self-isolate) until:   You've had no fever---and no medicine that reduces fever---for one full day (24 hours). And...   Your other symptoms have gotten better. For example, your cough or breathing has improved. And...   At least 10 days have passed since your symptoms started.       During this time, don't leave the house except for testing or medical care.   Stay in your own room, even for meals. Use your own bathroom if you can.   Stay away from others in your home. No hugging, kissing or shaking hands. No visitors.  Don't go to work, school or anywhere else.    Clean \"high touch\" surfaces often (doorknobs, counters, handles, etc.). Use a household cleaning spray or wipes. You'll find a full list of  on the EPA website: www.epa.gov/pesticide-registration/list-n-disinfectants-use-against-sars-cov-2.   Cover your mouth and nose with a mask, tissue or washcloth to avoid spreading germs.  Wash your hands and face often. Use soap and water.  Caregivers in these groups are at risk for severe illness due to COVID-19:  o People 65 years and older  o People who live in a nursing home or long-term care facility  o People with chronic disease (lung, heart, cancer, diabetes, kidney, liver, immunologic)  o People who have a weakened immune system, including those who:   Are in cancer treatment  Take medicine that weakens the immune system, such as corticosteroids  Had a bone marrow or organ transplant  Have an immune deficiency  Have poorly controlled HIV or AIDS  Are obese (body mass index of 40 or higher)  Smoke regularly   o Caregivers should wear gloves while washing dishes, handling laundry and cleaning bedrooms and bathrooms.  o Use caution when washing and drying laundry: Don't shake dirty laundry, and use the warmest water setting that you can.  o For more tips, go to www.cdc.gov/coronavirus/2019-ncov/downloads/10Things.pdf.       How can I take care of myself?   Get lots of rest. Drink extra fluids " (unless a doctor has told you not to).   Take Tylenol (acetaminophen) for fever or pain. If you have liver or kidney problems, ask your family doctor if it's okay to take Tylenol.   Adults can take either:    650 mg (two 325 mg pills) every 4 to 6 hours, or...   1,000 mg (two 500 mg pills) every 8 hours as needed.    Note: Don't take more than 3,000 mg in one day. Acetaminophen is found in many medicines (both prescribed and over-the-counter medicines). Read all labels to be sure you don't take too much.   For children, check the Tylenol bottle for the right dose. The dose is based on the child's age or weight.    If you have other health problems (like cancer, heart failure, an organ transplant or severe kidney disease): Call your specialty clinic if you don't feel better in the next 2 days.       Know when to call 911. Emergency warning signs include:    Trouble breathing or shortness of breath Pain or pressure in the chest that doesn't go away Feeling confused like you haven't felt before, or not being able to wake up Bluish-colored lips or face.  Where can I get more information?   [facility] -- About COVID-19: [email or web address]   CDC -- What to Do If You're Sick: www.cdc.gov/coronavirus/2019-ncov/about/steps-when-sick.html   CDC -- Ending Home Isolation: www.cdc.gov/coronavirus/2019-ncov/hcp/disposition-in-home-patients.html   CDC -- Caring for Someone: www.cdc.gov/coronavirus/2019-ncov/if-you-are-sick/care-for-someone.html   [facility] -- Interim Guidance for Hospital Discharge to Home: www.health.[facility].[place]/diseases/coronavirus/hcp/hospdischarge.pdf   [facility] clinical trials (COVID-19 research studies): [email or web address]    Below are the COVID-19 hotlines at the [place] Department of Health ([facility]). Interpreters are available.    For health questions: Call 638-164-0412 or 1-383.338.6935 (7 a.m. to 7 p.m.) For questions about schools and childcare: Call  768-362-2566 or 1-751.695.4392 (7 a.m. to 7 p.m.)    Diagnosis: Cough  Diagnosis ICD: R05

## 2020-08-19 DIAGNOSIS — Z20.822 SUSPECTED 2019 NOVEL CORONAVIRUS INFECTION: Primary | ICD-10-CM

## 2020-11-20 ENCOUNTER — IMMUNIZATION (OUTPATIENT)
Dept: FAMILY MEDICINE | Facility: OTHER | Age: 40
End: 2020-11-20
Payer: COMMERCIAL

## 2020-11-20 DIAGNOSIS — Z23 NEED FOR PROPHYLACTIC VACCINATION AND INOCULATION AGAINST INFLUENZA: Primary | ICD-10-CM

## 2020-11-20 PROCEDURE — 90686 IIV4 VACC NO PRSV 0.5 ML IM: CPT

## 2020-11-20 PROCEDURE — 99207 PR NO CHARGE NURSE ONLY: CPT

## 2020-11-20 PROCEDURE — 90471 IMMUNIZATION ADMIN: CPT

## 2020-12-14 ENCOUNTER — HEALTH MAINTENANCE LETTER (OUTPATIENT)
Age: 40
End: 2020-12-14

## 2021-02-27 ENCOUNTER — HEALTH MAINTENANCE LETTER (OUTPATIENT)
Age: 41
End: 2021-02-27

## 2021-10-02 ENCOUNTER — HEALTH MAINTENANCE LETTER (OUTPATIENT)
Age: 41
End: 2021-10-02

## 2022-01-22 ENCOUNTER — HEALTH MAINTENANCE LETTER (OUTPATIENT)
Age: 42
End: 2022-01-22

## 2022-05-03 ENCOUNTER — HOSPITAL ENCOUNTER (OUTPATIENT)
Dept: MAMMOGRAPHY | Facility: CLINIC | Age: 42
Discharge: HOME | End: 2022-05-03
Attending: NURSE PRACTITIONER | Admitting: NURSE PRACTITIONER
Payer: COMMERCIAL

## 2022-05-03 ENCOUNTER — OFFICE VISIT (OUTPATIENT)
Dept: FAMILY MEDICINE | Facility: CLINIC | Age: 42
End: 2022-05-03
Payer: COMMERCIAL

## 2022-05-03 VITALS
WEIGHT: 143 LBS | TEMPERATURE: 97.9 F | HEART RATE: 82 BPM | OXYGEN SATURATION: 100 % | SYSTOLIC BLOOD PRESSURE: 120 MMHG | RESPIRATION RATE: 14 BRPM | BODY MASS INDEX: 25.34 KG/M2 | DIASTOLIC BLOOD PRESSURE: 78 MMHG | HEIGHT: 63 IN

## 2022-05-03 DIAGNOSIS — Z12.31 ENCOUNTER FOR SCREENING MAMMOGRAM FOR BREAST CANCER: ICD-10-CM

## 2022-05-03 DIAGNOSIS — Z13.220 LIPID SCREENING: ICD-10-CM

## 2022-05-03 DIAGNOSIS — Z00.00 ROUTINE GENERAL MEDICAL EXAMINATION AT A HEALTH CARE FACILITY: Primary | ICD-10-CM

## 2022-05-03 DIAGNOSIS — Z12.4 SCREENING FOR CERVICAL CANCER: ICD-10-CM

## 2022-05-03 DIAGNOSIS — R00.2 PALPITATIONS: ICD-10-CM

## 2022-05-03 LAB
ANION GAP SERPL CALCULATED.3IONS-SCNC: 4 MMOL/L (ref 3–14)
BASOPHILS # BLD AUTO: 0.2 10E3/UL (ref 0–0.2)
BASOPHILS NFR BLD AUTO: 1 %
BUN SERPL-MCNC: 10 MG/DL (ref 7–30)
CALCIUM SERPL-MCNC: 8.7 MG/DL (ref 8.5–10.1)
CHLORIDE BLD-SCNC: 111 MMOL/L (ref 94–109)
CHOLEST SERPL-MCNC: 180 MG/DL
CO2 SERPL-SCNC: 26 MMOL/L (ref 20–32)
CREAT SERPL-MCNC: 0.7 MG/DL (ref 0.52–1.04)
EOSINOPHIL # BLD AUTO: 0.6 10E3/UL (ref 0–0.7)
EOSINOPHIL NFR BLD AUTO: 5 %
ERYTHROCYTE [DISTWIDTH] IN BLOOD BY AUTOMATED COUNT: 12.5 % (ref 10–15)
FASTING STATUS PATIENT QL REPORTED: YES
GFR SERPL CREATININE-BSD FRML MDRD: >90 ML/MIN/1.73M2
GLUCOSE BLD-MCNC: 103 MG/DL (ref 70–99)
HCT VFR BLD AUTO: 40.1 % (ref 35–47)
HDLC SERPL-MCNC: 62 MG/DL
HGB BLD-MCNC: 13.3 G/DL (ref 11.7–15.7)
IMM GRANULOCYTES # BLD: 0 10E3/UL
IMM GRANULOCYTES NFR BLD: 0 %
LDLC SERPL CALC-MCNC: 100 MG/DL
LYMPHOCYTES # BLD AUTO: 3.1 10E3/UL (ref 0.8–5.3)
LYMPHOCYTES NFR BLD AUTO: 24 %
MCH RBC QN AUTO: 31.4 PG (ref 26.5–33)
MCHC RBC AUTO-ENTMCNC: 33.2 G/DL (ref 31.5–36.5)
MCV RBC AUTO: 95 FL (ref 78–100)
MONOCYTES # BLD AUTO: 0.7 10E3/UL (ref 0–1.3)
MONOCYTES NFR BLD AUTO: 6 %
NEUTROPHILS # BLD AUTO: 8 10E3/UL (ref 1.6–8.3)
NEUTROPHILS NFR BLD AUTO: 64 %
NONHDLC SERPL-MCNC: 118 MG/DL
NRBC # BLD AUTO: 0 10E3/UL
NRBC BLD AUTO-RTO: 0 /100
PLATELET # BLD AUTO: 444 10E3/UL (ref 150–450)
POTASSIUM BLD-SCNC: 3.7 MMOL/L (ref 3.4–5.3)
RBC # BLD AUTO: 4.23 10E6/UL (ref 3.8–5.2)
SODIUM SERPL-SCNC: 141 MMOL/L (ref 133–144)
TRIGL SERPL-MCNC: 90 MG/DL
TSH SERPL DL<=0.005 MIU/L-ACNC: 2 MU/L (ref 0.4–4)
WBC # BLD AUTO: 12.6 10E3/UL (ref 4–11)

## 2022-05-03 PROCEDURE — 99396 PREV VISIT EST AGE 40-64: CPT | Performed by: NURSE PRACTITIONER

## 2022-05-03 PROCEDURE — 36415 COLL VENOUS BLD VENIPUNCTURE: CPT | Performed by: NURSE PRACTITIONER

## 2022-05-03 PROCEDURE — 80048 BASIC METABOLIC PNL TOTAL CA: CPT | Performed by: NURSE PRACTITIONER

## 2022-05-03 PROCEDURE — 99213 OFFICE O/P EST LOW 20 MIN: CPT | Mod: 25 | Performed by: NURSE PRACTITIONER

## 2022-05-03 PROCEDURE — 80061 LIPID PANEL: CPT | Performed by: NURSE PRACTITIONER

## 2022-05-03 PROCEDURE — 77067 SCR MAMMO BI INCL CAD: CPT

## 2022-05-03 PROCEDURE — 84443 ASSAY THYROID STIM HORMONE: CPT | Performed by: NURSE PRACTITIONER

## 2022-05-03 PROCEDURE — 85025 COMPLETE CBC W/AUTO DIFF WBC: CPT | Performed by: NURSE PRACTITIONER

## 2022-05-03 PROCEDURE — 87624 HPV HI-RISK TYP POOLED RSLT: CPT | Performed by: NURSE PRACTITIONER

## 2022-05-03 PROCEDURE — 93000 ELECTROCARDIOGRAM COMPLETE: CPT | Performed by: NURSE PRACTITIONER

## 2022-05-03 PROCEDURE — G0145 SCR C/V CYTO,THINLAYER,RESCR: HCPCS | Performed by: NURSE PRACTITIONER

## 2022-05-03 ASSESSMENT — ENCOUNTER SYMPTOMS
EYE PAIN: 0
SHORTNESS OF BREATH: 0
HEARTBURN: 1
HEMATURIA: 0
HEADACHES: 1
PARESTHESIAS: 0
ARTHRALGIAS: 0
PALPITATIONS: 0
NERVOUS/ANXIOUS: 1
CHILLS: 0
DIZZINESS: 1
SORE THROAT: 0
MYALGIAS: 0
FREQUENCY: 1
CONSTIPATION: 0
DYSURIA: 0
FEVER: 0
JOINT SWELLING: 0
NAUSEA: 0
HEMATOCHEZIA: 0
WEAKNESS: 1
ABDOMINAL PAIN: 0
DIARRHEA: 0
BREAST MASS: 0
COUGH: 1

## 2022-05-03 ASSESSMENT — PAIN SCALES - GENERAL: PAINLEVEL: MILD PAIN (3)

## 2022-05-03 NOTE — PROGRESS NOTES
SUBJECTIVE:   CC: Sharon Caraballo is an 41 year old woman who presents for preventive health visit.       Patient has been advised of split billing requirements and indicates understanding: Yes  Healthy Habits:     Getting at least 3 servings of Calcium per day:  Yes    Bi-annual eye exam:  Yes    Dental care twice a year:  Yes    Sleep apnea or symptoms of sleep apnea:  None    Diet:  Regular (no restrictions)    Frequency of exercise:  None    Taking medications regularly:  Not Applicable    Medication side effects:  Not applicable    PHQ-2 Total Score: 1    Additional concerns today:  Yes    Still with periodic heart palpatations.  Quit caffeine.  Not sure if with anxiety.  Couple times a week.  Still smoking.  Lasts a second.  No chest pain or dizziness with it.      Today's PHQ-2 Score:   PHQ-2 (  [employer]) 5/3/2022   Q1: Little interest or pleasure in doing things 1   Q2: Feeling down, depressed or hopeless 0   PHQ-2 Score 1   PHQ-2 Total Score (12-17 Years)- Positive if 3 or more points; Administer PHQ-A if positive -   Q1: Little interest or pleasure in doing things Several days   Q2: Feeling down, depressed or hopeless Not at all   PHQ-2 Score 1       Abuse: Current or Past (Physical, Sexual or Emotional) - No  Do you feel safe in your environment? Yes    Have you ever done Advance Care Planning? (For example, a Health Directive, POLST, or a discussion with a medical provider or your loved ones about your wishes): No, advance care planning information given to patient to review.  Patient plans to discuss their wishes with loved ones or provider.      Social History     Tobacco Use     Smoking status: Current Every Day Smoker     Packs/day: 0.50     Years: 10.00     Pack years: 5.00     Types: Cigarettes     Last attempt to quit: 2017     Years since quittin.2     Smokeless tobacco: Never Used     Tobacco comment: 4-5 cigs per day   Substance Use Topics     Alcohol use: Yes     Comment:  rare         Alcohol Use 5/3/2022   Prescreen: >3 drinks/day or >7 drinks/week? No   Prescreen: >3 drinks/day or >7 drinks/week? -       Reviewed orders with patient.  Reviewed health maintenance and updated orders accordingly - Yes  Lab work is in process  Labs reviewed in EPIC    Breast Cancer Screening:    FHS-7:   Breast CA Risk Assessment (FHS-7) 5/3/2022 5/3/2022   Did any of your first-degree relatives have breast or ovarian cancer? Yes No   Did any of your relatives have bilateral breast cancer? Unknown No   Did any man in your family have breast cancer? No No   Did any woman in your family have breast and ovarian cancer? Yes No   Did any woman in your family have breast cancer before age 50 y? No No   Do you have 2 or more relatives with breast and/or ovarian cancer? No No   Do you have 2 or more relatives with breast and/or bowel cancer? No No       Mammogram Screening - Offered annual screening and updated Health Maintenance for mutual plan based on risk factor consideration    Pertinent mammograms are reviewed under the imaging tab.    History of abnormal Pap smear: NO - age 30-65 PAP every 5 years with negative HPV co-testing recommended  PAP / HPV Latest Ref Rng & Units 11/27/2017 7/18/2014   PAP (Historical) - NIL NIL   HPV16 NEG:Negative Negative -   HPV18 NEG:Negative Negative -   HRHPV NEG:Negative Negative -     Reviewed and updated as needed this visit by clinical staff   Tobacco  Allergies  Meds  Problems  Med Hx  Surg Hx  Fam Hx  Soc   Hx          Reviewed and updated as needed this visit by Provider   Tobacco  Allergies  Meds  Problems  Med Hx  Surg Hx  Fam Hx           Past Medical History:   Diagnosis Date     Female infertility of unspecified origin 8/28/2014     GDM, class A1 3/23/2015     Lump or mass in breast      Miscarriage     x3     Septate uterus 8/28/2014     Tubal pregnancy 2004      Past Surgical History:   Procedure Laterality Date     ABDOMEN SURGERY      tubal  "pregnancy, gullbladder     CHOLECYSTECTOMY  1999    laparoscopic     COLONOSCOPY       DILATION AND CURETTAGE  2005    at the time of her tubal pregnancy     GYN SURGERY Right 2005    right tubal pregnancy       Review of Systems   Constitutional: Negative for chills and fever.   HENT: Negative for congestion, ear pain, hearing loss and sore throat.    Eyes: Negative for pain and visual disturbance.   Respiratory: Positive for cough. Negative for shortness of breath.    Cardiovascular: Negative for chest pain, palpitations and peripheral edema.   Gastrointestinal: Positive for heartburn. Negative for abdominal pain, constipation, diarrhea, hematochezia and nausea.   Breasts:  Negative for tenderness, breast mass and discharge.   Genitourinary: Positive for frequency and urgency. Negative for dysuria, genital sores, hematuria, pelvic pain, vaginal bleeding and vaginal discharge.   Musculoskeletal: Negative for arthralgias, joint swelling and myalgias.   Skin: Negative for rash.   Neurological: Positive for dizziness, weakness and headaches. Negative for paresthesias.   Psychiatric/Behavioral: Negative for mood changes. The patient is nervous/anxious.         OBJECTIVE:   /78 (BP Location: Right arm, Patient Position: Sitting, Cuff Size: Adult Regular)   Pulse 82   Temp 97.9  F (36.6  C) (Temporal)   Resp 14   Ht 1.6 m (5' 3\")   Wt 64.9 kg (143 lb)   LMP 04/19/2022 (Approximate)   SpO2 100%   BMI 25.33 kg/m    Physical Exam  GENERAL: healthy, alert and no distress  EYES: Eyes grossly normal to inspection, PERRL and conjunctivae and sclerae normal  HENT: ear canals and TM's normal, nose and mouth without ulcers or lesions  NECK: no adenopathy, no asymmetry, masses, or scars and thyroid normal to palpation  RESP: lungs clear to auscultation - no rales, rhonchi or wheezes  BREAST: normal without masses, tenderness or nipple discharge and no palpable axillary masses or adenopathy  CV: regular rate and " "rhythm, normal S1 S2, no S3 or S4, no murmur, click or rub, no peripheral edema and peripheral pulses strong  ABDOMEN: soft, nontender, no hepatosplenomegaly, no masses and bowel sounds normal  MS: no gross musculoskeletal defects noted, no edema  SKIN: no suspicious lesions or rashes  NEURO: Normal strength and tone, mentation intact and speech normal  PSYCH: mentation appears normal, affect normal/bright    Diagnostic Test Results:  Labs reviewed in Epic    ASSESSMENT/PLAN:   (Z00.00) Routine general medical examination at a health care facility  (primary encounter diagnosis)  Comment: recommend yearly physicals    (R00.2) Palpitations  Comment: EKG in clinic with NSR no ectopy or ischemia.  Will check basic labs. Did cut out caffeine.  Still smoking 10 cigarettes a day.  Did not tolerate wellbutrin or chantix.  May need zio patch if labs normal and recurs  Plan: TSH with free T4 reflex, CBC with platelets and        differential, EKG 12-lead complete w/read -         Clinics, Basic metabolic panel  (Ca, Cl, CO2,         Creat, Gluc, K, Na, BUN)    (Z12.4) Screening for cervical cancer  Comment: screen  Plan: Pap screen with HPV - recommended age 30 - 65         years    (Z13.220) Lipid screening  Comment: screen  Plan: Lipid panel reflex to direct LDL Fasting    (Z12.31) Encounter for screening mammogram for breast cancer  Comment: screen  Plan: MA Screen Bilateral w/Mk      Patient has been advised of split billing requirements and indicates understanding: Yes    COUNSELING:  Reviewed preventive health counseling, as reflected in patient instructions    Estimated body mass index is 25.33 kg/m  as calculated from the following:    Height as of this encounter: 1.6 m (5' 3\").    Weight as of this encounter: 64.9 kg (143 lb).        She reports that she has been smoking cigarettes. She has a 5.00 pack-year smoking history. She has never used smokeless tobacco.  Tobacco Cessation Action Plan:   Information offered: " Patient not interested at this time      Counseling Resources:  ATP IV Guidelines  Pooled Cohorts Equation Calculator  Breast Cancer Risk Calculator  BRCA-Related Cancer Risk Assessment: FHS-7 Tool  FRAX Risk Assessment  ICSI Preventive Guidelines  Dietary Guidelines for Americans, 2010  [employer] MyPlate  ASA Prophylaxis  Lung CA Screening    Neda Mancilla NP  [facility]

## 2022-05-04 ENCOUNTER — MYC MEDICAL ADVICE (OUTPATIENT)
Dept: FAMILY MEDICINE | Facility: CLINIC | Age: 42
End: 2022-05-04
Payer: COMMERCIAL

## 2022-05-04 DIAGNOSIS — N63.20 LEFT BREAST MASS: Primary | ICD-10-CM

## 2022-05-05 LAB
BKR LAB AP GYN ADEQUACY: NORMAL
BKR LAB AP GYN INTERPRETATION: NORMAL
BKR LAB AP HPV REFLEX: NORMAL
BKR LAB AP LMP: NORMAL
BKR LAB AP PREVIOUS ABNORMAL: NORMAL
PATH REPORT.COMMENTS IMP SPEC: NORMAL
PATH REPORT.COMMENTS IMP SPEC: NORMAL
PATH REPORT.RELEVANT HX SPEC: NORMAL

## 2022-05-06 LAB
HUMAN PAPILLOMA VIRUS 16 DNA: NEGATIVE
HUMAN PAPILLOMA VIRUS 18 DNA: NEGATIVE
HUMAN PAPILLOMA VIRUS FINAL DIAGNOSIS: NORMAL
HUMAN PAPILLOMA VIRUS OTHER HR: NEGATIVE

## 2022-05-18 ENCOUNTER — HOSPITAL ENCOUNTER (OUTPATIENT)
Dept: ULTRASOUND IMAGING | Facility: CLINIC | Age: 42
Discharge: HOME | End: 2022-05-18
Attending: NURSE PRACTITIONER
Payer: COMMERCIAL

## 2022-05-18 DIAGNOSIS — N63.20 LEFT BREAST MASS: ICD-10-CM

## 2022-05-18 PROCEDURE — 76642 ULTRASOUND BREAST LIMITED: CPT | Mod: LT

## 2022-09-04 ENCOUNTER — HEALTH MAINTENANCE LETTER (OUTPATIENT)
Age: 42
End: 2022-09-04

## 2023-04-20 ENCOUNTER — PATIENT OUTREACH (OUTPATIENT)
Dept: CARE COORDINATION | Facility: CLINIC | Age: 43
End: 2023-04-20
Payer: COMMERCIAL

## 2023-05-16 ENCOUNTER — MYC MEDICAL ADVICE (OUTPATIENT)
Dept: FAMILY MEDICINE | Facility: CLINIC | Age: 43
End: 2023-05-16
Payer: COMMERCIAL

## 2023-05-19 ENCOUNTER — OFFICE VISIT (OUTPATIENT)
Dept: FAMILY MEDICINE | Facility: CLINIC | Age: 43
End: 2023-05-19
Payer: COMMERCIAL

## 2023-05-19 VITALS
OXYGEN SATURATION: 99 % | BODY MASS INDEX: 26.13 KG/M2 | TEMPERATURE: 98 F | HEIGHT: 63 IN | HEART RATE: 88 BPM | DIASTOLIC BLOOD PRESSURE: 64 MMHG | RESPIRATION RATE: 16 BRPM | SYSTOLIC BLOOD PRESSURE: 116 MMHG | WEIGHT: 147.5 LBS

## 2023-05-19 DIAGNOSIS — R06.02 SHORTNESS OF BREATH: Primary | ICD-10-CM

## 2023-05-19 DIAGNOSIS — G89.29 CHRONIC BILATERAL LOW BACK PAIN WITH LEFT-SIDED SCIATICA: ICD-10-CM

## 2023-05-19 DIAGNOSIS — Z82.49 FAMILY HISTORY OF HEART ATTACK: ICD-10-CM

## 2023-05-19 DIAGNOSIS — R94.39 ABNORMAL STRESS ECHO: ICD-10-CM

## 2023-05-19 DIAGNOSIS — R94.39 ABNORMAL STRESS TEST: ICD-10-CM

## 2023-05-19 DIAGNOSIS — M54.2 NECK PAIN: ICD-10-CM

## 2023-05-19 DIAGNOSIS — M54.42 CHRONIC BILATERAL LOW BACK PAIN WITH LEFT-SIDED SCIATICA: ICD-10-CM

## 2023-05-19 LAB
ALBUMIN SERPL BCG-MCNC: 3.9 G/DL (ref 3.5–5.2)
ALP SERPL-CCNC: 88 U/L (ref 35–104)
ALT SERPL W P-5'-P-CCNC: 12 U/L (ref 10–35)
ANION GAP SERPL CALCULATED.3IONS-SCNC: 10 MMOL/L (ref 7–15)
AST SERPL W P-5'-P-CCNC: 18 U/L (ref 10–35)
BILIRUB SERPL-MCNC: 0.4 MG/DL
BUN SERPL-MCNC: 11 MG/DL (ref 6–20)
CALCIUM SERPL-MCNC: 9.4 MG/DL (ref 8.6–10)
CHLORIDE SERPL-SCNC: 101 MMOL/L (ref 98–107)
CHOLEST SERPL-MCNC: 185 MG/DL
CREAT SERPL-MCNC: 0.73 MG/DL (ref 0.51–0.95)
DEPRECATED HCO3 PLAS-SCNC: 25 MMOL/L (ref 22–29)
GFR SERPL CREATININE-BSD FRML MDRD: >90 ML/MIN/1.73M2
GLUCOSE SERPL-MCNC: 98 MG/DL (ref 70–99)
HDLC SERPL-MCNC: 54 MG/DL
LDLC SERPL CALC-MCNC: 115 MG/DL
NONHDLC SERPL-MCNC: 131 MG/DL
POTASSIUM SERPL-SCNC: 3.4 MMOL/L (ref 3.4–5.3)
PROT SERPL-MCNC: 7.1 G/DL (ref 6.4–8.3)
SODIUM SERPL-SCNC: 136 MMOL/L (ref 136–145)
TRIGL SERPL-MCNC: 82 MG/DL

## 2023-05-19 PROCEDURE — 99214 OFFICE O/P EST MOD 30 MIN: CPT | Performed by: NURSE PRACTITIONER

## 2023-05-19 PROCEDURE — 80061 LIPID PANEL: CPT | Performed by: NURSE PRACTITIONER

## 2023-05-19 PROCEDURE — 80053 COMPREHEN METABOLIC PANEL: CPT | Performed by: NURSE PRACTITIONER

## 2023-05-19 PROCEDURE — 36415 COLL VENOUS BLD VENIPUNCTURE: CPT | Performed by: NURSE PRACTITIONER

## 2023-05-19 ASSESSMENT — PAIN SCALES - GENERAL: PAINLEVEL: MODERATE PAIN (5)

## 2023-05-19 NOTE — PROGRESS NOTES
"  Assessment & Plan     Shortness of breath/ Family history of heart attack  Patient with strong family history of MI- with brother just dying at age 40 and mother with MI and CABG in her early 50's.  She has had some dyspnea on exertion.  Did have EKG last year normal.  She is a smoker- discussed cessation, will check lipids- were normal last year.  BP controlled.  BMI 26.21.  Know to call 911 or go to ED if chest pain, or worrisome symptoms  - Echocardiogram Exercise Stress; Future  - Lipid panel reflex to direct LDL Fasting; Future  - Comprehensive metabolic panel (BMP + Alb, Alk Phos, ALT, AST, Total. Bili, TP); Future  - Lipid panel reflex to direct LDL Fasting  - Comprehensive metabolic panel (BMP + Alb, Alk Phos, ALT, AST, Total. Bili, TP)    Chronic bilateral low back pain with left-sided sciatica  Tried chiropractor with worsening of pain.  Has not been exercising due to her brothers death and her worry about heart disease.  Has standing desk at home but not using.  Referral Physical Therapy   - Physical Therapy Referral; Future    Neck pain  Physical Therapy   - Physical Therapy Referral; Future    35 minutes spent by me on the date of the encounter doing chart review, review of test results, interpretation of tests, patient visit and documentation        Nicotine/Tobacco Cessation:  She reports that she has been smoking cigarettes. She has a 5.00 pack-year smoking history. She has never used smokeless tobacco.  Nicotine/Tobacco Cessation Plan:   Information offered: Patient not interested at this time      BMI:   Estimated body mass index is 26.21 kg/m  as calculated from the following:    Height as of this encounter: 1.598 m (5' 2.9\").    Weight as of this encounter: 66.9 kg (147 lb 8 oz).   Weight management plan: Discussed healthy diet and exercise guidelines      Neda Mancilla NP  [facility]    Krerie Herrera is a 42 year old, presenting for the following health " "issues:  Recheck Medication        5/19/2023     9:43 AM   Additional Questions   Roomed by Aleksandra BARBER     History of Present Illness       Reason for visit:  Health    She eats 0-1 servings of fruits and vegetables daily.She consumes 2 sweetened beverage(s) daily.She exercises with enough effort to increase her heart rate 9 or less minutes per day.  She exercises with enough effort to increase her heart rate 3 or less days per week.   She is taking medications regularly.     See mychart note on 05/16/2023- Brother passed away at early age.     Father passed at 67 from interstitial lung disease.  Brother with cardiac arrest- he was 40.  He has HTN and was not taking meds.    Went fishing with her  her brother- they pulled and he was winded.  Passed Jan 26th.      Mother with heart disease- in her 50's first heart attack- stents, pacemaker, defibrillator, paternal grandmother- 60's    She denies chest pain.  Feels out of breath with a flight of steps, carrying something heavy.  Resolves quickly.  Every once in awhile feels dizziness and left arm feels weird- not sure if tight shoulder.      She smokes-  Most pack a day- regular day 1/2 ppd.  Tried the patch- worried would have heart attacks.   No real caffeine  Seen may for palpitations- felt due to anxiety, wellbutrin -0 emesis- felt awful.  Tried chantix- had crazy dreams.      Low back pain- has had since daughter turned 6.  Sits all day long.  Wondering if sciatic- if stis will sometimes cause pinching.  Has standing desk- does not utlize when standing vs.  Sitting.  Has yoga ball.  Did try chiropractor- made it worse.          Review of Systems   Constitutional, HEENT, cardiovascular, pulmonary, GI, , musculoskeletal, neuro, skin, endocrine and psych systems are negative, except as otherwise noted.      Objective    /64   Pulse 88   Temp 98  F (36.7  C) (Temporal)   Resp 16   Ht 1.598 m (5' 2.9\")   Wt 66.9 kg (147 lb 8 oz)   LMP 05/14/2023   " SpO2 99%   BMI 26.21 kg/m    Body mass index is 26.21 kg/m .  Physical Exam   GENERAL: healthy, alert and no distress  EYES: Eyes grossly normal to inspection, PERRL and conjunctivae and sclerae normal  HENT: ear canals and TM's normal, nose and mouth without ulcers or lesions  NECK: no adenopathy, no asymmetry, masses, or scars and thyroid normal to palpation  RESP: lungs clear to auscultation - no rales, rhonchi or wheezes  CV: regular rate and rhythm, normal S1 S2, no S3 or S4, no murmur, click or rub, no peripheral edema and peripheral pulses strong  ABDOMEN: soft, nontender, no hepatosplenomegaly, no masses and bowel sounds normal  MS: no gross musculoskeletal defects noted, no edema    Results for orders placed or performed in visit on 05/19/23 (from the past 24 hour(s))   Lipid panel reflex to direct LDL Fasting   Result Value Ref Range    Cholesterol 185 <200 mg/dL    Triglycerides 82 <150 mg/dL    Direct Measure HDL 54 >=50 mg/dL    LDL Cholesterol Calculated 115 (H) <=100 mg/dL    Non HDL Cholesterol 131 (H) <130 mg/dL    Narrative    Cholesterol  Desirable:  <200 mg/dL    Triglycerides  Normal:  Less than 150 mg/dL  Borderline High:  150-199 mg/dL  High:  200-499 mg/dL  Very High:  Greater than or equal to 500 mg/dL    Direct Measure HDL  Female:  Greater than or equal to 50 mg/dL   Male:  Greater than or equal to 40 mg/dL    LDL Cholesterol  Desirable:  <100mg/dL  Above Desirable:  100-129 mg/dL   Borderline High:  130-159 mg/dL   High:  160-189 mg/dL   Very High:  >= 190 mg/dL    Non HDL Cholesterol  Desirable:  130 mg/dL  Above Desirable:  130-159 mg/dL  Borderline High:  160-189 mg/dL  High:  190-219 mg/dL  Very High:  Greater than or equal to 220 mg/dL   Comprehensive metabolic panel (BMP + Alb, Alk Phos, ALT, AST, Total. Bili, TP)   Result Value Ref Range    Sodium 136 136 - 145 mmol/L    Potassium 3.4 3.4 - 5.3 mmol/L    Chloride 101 98 - 107 mmol/L    Carbon Dioxide (CO2) 25 22 - 29 mmol/L     Anion Gap 10 7 - 15 mmol/L    Urea Nitrogen 11.0 6.0 - 20.0 mg/dL    Creatinine 0.73 0.51 - 0.95 mg/dL    Calcium 9.4 8.6 - 10.0 mg/dL    Glucose 98 70 - 99 mg/dL    Alkaline Phosphatase 88 35 - 104 U/L    AST 18 10 - 35 U/L    ALT 12 10 - 35 U/L    Protein Total 7.1 6.4 - 8.3 g/dL    Albumin 3.9 3.5 - 5.2 g/dL    Bilirubin Total 0.4 <=1.2 mg/dL    GFR Estimate >90 >60 mL/min/1.73m2

## 2023-05-24 ENCOUNTER — HOSPITAL ENCOUNTER (OUTPATIENT)
Dept: CARDIOLOGY | Facility: CLINIC | Age: 43
Discharge: HOME | End: 2023-05-24
Attending: NURSE PRACTITIONER | Admitting: NURSE PRACTITIONER
Payer: COMMERCIAL

## 2023-05-24 DIAGNOSIS — Z82.49 FAMILY HISTORY OF HEART ATTACK: ICD-10-CM

## 2023-05-24 DIAGNOSIS — R06.02 SHORTNESS OF BREATH: ICD-10-CM

## 2023-05-24 PROCEDURE — 999N000208 ECHO STRESS ECHOCARDIOGRAM

## 2023-05-24 PROCEDURE — 93325 DOPPLER ECHO COLOR FLOW MAPG: CPT | Mod: 26 | Performed by: INTERNAL MEDICINE

## 2023-05-24 PROCEDURE — 93321 DOPPLER ECHO F-UP/LMTD STD: CPT | Mod: 26 | Performed by: INTERNAL MEDICINE

## 2023-05-24 PROCEDURE — 93018 CV STRESS TEST I&R ONLY: CPT | Performed by: INTERNAL MEDICINE

## 2023-05-24 PROCEDURE — 93016 CV STRESS TEST SUPVJ ONLY: CPT | Performed by: INTERNAL MEDICINE

## 2023-05-24 PROCEDURE — 255N000002 HC RX 255 OP 636: Performed by: INTERNAL MEDICINE

## 2023-05-24 PROCEDURE — 93350 STRESS TTE ONLY: CPT | Mod: 26 | Performed by: INTERNAL MEDICINE

## 2023-05-24 PROCEDURE — 93017 CV STRESS TEST TRACING ONLY: CPT

## 2023-05-24 RX ADMIN — HUMAN ALBUMIN MICROSPHERES AND PERFLUTREN 4 ML: 10; .22 INJECTION, SOLUTION INTRAVENOUS at 10:29

## 2023-05-25 PROBLEM — R94.39 ABNORMAL STRESS ECHO: Status: ACTIVE | Noted: 2023-05-25

## 2023-05-30 ENCOUNTER — OFFICE VISIT (OUTPATIENT)
Dept: CARDIOLOGY | Facility: CLINIC | Age: 43
End: 2023-05-30
Attending: NURSE PRACTITIONER
Payer: COMMERCIAL

## 2023-05-30 VITALS
BODY MASS INDEX: 25.99 KG/M2 | HEART RATE: 94 BPM | DIASTOLIC BLOOD PRESSURE: 64 MMHG | WEIGHT: 146.7 LBS | HEIGHT: 63 IN | OXYGEN SATURATION: 98 % | SYSTOLIC BLOOD PRESSURE: 118 MMHG

## 2023-05-30 DIAGNOSIS — Z82.49 FAMILY HISTORY OF HEART ATTACK: ICD-10-CM

## 2023-05-30 DIAGNOSIS — R94.39 ABNORMAL STRESS TEST: ICD-10-CM

## 2023-05-30 DIAGNOSIS — R07.9 CHEST PAIN, UNSPECIFIED TYPE: Primary | ICD-10-CM

## 2023-05-30 PROCEDURE — 99204 OFFICE O/P NEW MOD 45 MIN: CPT | Mod: 25 | Performed by: INTERNAL MEDICINE

## 2023-05-30 PROCEDURE — 93000 ELECTROCARDIOGRAM COMPLETE: CPT | Performed by: INTERNAL MEDICINE

## 2023-05-30 RX ORDER — ASPIRIN 81 MG/1
81 TABLET ORAL DAILY
COMMUNITY
Start: 2023-05-30

## 2023-05-30 RX ORDER — METOPROLOL SUCCINATE 50 MG/1
TABLET, EXTENDED RELEASE ORAL
Qty: 2 TABLET | Refills: 0 | Status: SHIPPED | OUTPATIENT
Start: 2023-05-30 | End: 2023-05-30

## 2023-05-30 RX ORDER — METOPROLOL SUCCINATE 50 MG/1
TABLET, EXTENDED RELEASE ORAL
Qty: 2 TABLET | Refills: 0 | Status: SHIPPED | OUTPATIENT
Start: 2023-05-30 | End: 2023-12-05

## 2023-05-30 NOTE — PROGRESS NOTES
HPI and Plan:   See dictation    Today's clinic visit entailed:  Review of the result(s) of each unique test - flp, ecg, stress echo  The following tests were independently interpreted by me as noted in my documentation: ecg, stress echo  Ordering of each unique test  Prescription drug management  35 minutes spent by me on the date of the encounter doing chart review, review of test results, interpretation of tests, patient visit and documentation   Provider  Link to [facility] Help Grid     The level of medical decision making during this visit was of moderate complexity.      Orders Placed This Encounter   Procedures     EKG 12-lead complete w/read - Clinics (performed today)       Orders Placed This Encounter   Medications     DISCONTD: metoprolol succinate ER (TOPROL XL) 50 MG 24 hr tablet     Sig: Take one tablet at night on the day prior to the CT scan and one tablet on the morning of the CT scan     Dispense:  2 tablet     Refill:  0     aspirin 81 MG EC tablet     Sig: Take 1 tablet (81 mg) by mouth daily     metoprolol succinate ER (TOPROL XL) 50 MG 24 hr tablet     Sig: Take one tablet at night on the day prior to the CT scan and one tablet on the morning of the CT scan     Dispense:  2 tablet     Refill:  0       Medications Discontinued During This Encounter   Medication Reason     methylPREDNISolone (MEDROL DOSEPAK) 4 MG tablet therapy pack      nicotine (NICODERM CQ) 14 MG/24HR 24 hr patch      cyclobenzaprine (FLEXERIL) 10 MG tablet      acetaminophen (TYLENOL) 500 MG tablet      ibuprofen (ADVIL/MOTRIN) 200 MG tablet      metoprolol succinate ER (TOPROL XL) 50 MG 24 hr tablet          Encounter Diagnoses   Name Primary?     Abnormal stress test      Chest pain, unspecified type Yes     Family history of heart attack        CURRENT MEDICATIONS:  Current Outpatient Medications   Medication Sig Dispense Refill     aspirin 81 MG EC tablet Take 1 tablet (81 mg) by mouth daily       metoprolol succinate ER  (TOPROL XL) 50 MG 24 hr tablet Take one tablet at night on the day prior to the CT scan and one tablet on the morning of the CT scan 2 tablet 0       ALLERGIES     Allergies   Allergen Reactions     Penicillins Itching     Wellbutrin [Bupropion] Nausea and Vomiting       PAST MEDICAL HISTORY:  Past Medical History:   Diagnosis Date     Female infertility of unspecified origin 2014     GDM, class A1 3/23/2015     Lump or mass in breast      Miscarriage     x3     Septate uterus 2014     Tubal pregnancy        PAST SURGICAL HISTORY:  Past Surgical History:   Procedure Laterality Date     ABDOMEN SURGERY      tubal pregnancy, gullbladder     CHOLECYSTECTOMY      laparoscopic     COLONOSCOPY       DILATION AND CURETTAGE      at the time of her tubal pregnancy     GYN SURGERY Right 2005    right tubal pregnancy       FAMILY HISTORY:  Family History   Problem Relation Age of Onset     Heart Disease Mother 53        MI, s/p stents and defribrillator     Breast Cancer Mother      Arthritis Sister      Diabetes Sister         adult-onset     Obesity Sister      Diabetes Paternal Grandmother      Heart Disease Paternal Grandmother      Cerebrovascular Disease Paternal Grandfather        SOCIAL HISTORY:  Social History     Socioeconomic History     Marital status:      Spouse name: Everette Caraballo     Number of children: 1     Years of education: None     Highest education level: None   Occupational History     Occupation: Accounts Payable     Employer: [employer]   Tobacco Use     Smoking status: Every Day     Packs/day: 0.50     Years: 10.00     Pack years: 5.00     Types: Cigarettes     Last attempt to quit: 2017     Years since quittin.3     Smokeless tobacco: Never     Tobacco comments:     4-5 cigs per day   Vaping Use     Vaping status: Never Used   Substance and Sexual Activity     Alcohol use: Yes     Comment: rare     Drug use: No     Sexual activity: Yes     Partners:  "Male   Other Topics Concern      Service No     Blood Transfusions No     Caffeine Concern No     Occupational Exposure Yes     Comment: Accounts payable     Hobby Hazards No     Sleep Concern No     Stress Concern No     Weight Concern No     Special Diet No     Back Care Yes     Comment: 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  Everette smokes.  Advised about secondhand smoke.  No concerns about domestic violence.  No indoor cats/kittens.       Review of Systems:  Skin:          Eyes:         ENT:         Respiratory:  Negative shortness of breath;cough;wheezing     Cardiovascular:  Negative;edema;lightheadedness;syncope or near-syncope;dizziness Positive for;chest pain;palpitations left sided pain,  Gastroenterology:        Genitourinary:         Musculoskeletal:         Neurologic:  Negative numbness or tingling of hands;numbness or tingling of feet    Psychiatric:         Heme/Lymph/Imm:  Positive for allergies    Endocrine:           Physical Exam:  Vitals: /64 (BP Location: Right arm, Patient Position: Sitting, Cuff Size: Adult Regular)   Pulse 94   Ht 1.598 m (5' 2.9\")   Wt 66.5 kg (146 lb 11.2 oz)   LMP 05/14/2023   SpO2 98%   BMI 26.07 kg/m      Constitutional:           Skin:             Head:           Eyes:           Lymph:      ENT:           Neck:           Respiratory:            Cardiac:                                                           GI:           Extremities and Muscular Skeletal:                 Neurological:           Psych:           CC  Neda Mancilla, NP  [address] [place] [address]              "

## 2023-05-30 NOTE — PROGRESS NOTES
Service Date: 2023    HISTORY OF PRESENT ILLNESS:  I had the opportunity to see Ms. Sharon Caraballo in Cardiology Clinic today at [facility] in [place] for consultation regarding symptoms of shortness of breath and chest discomfort.  She is a 42-year-old woman who is currently smoking, who has a strong family history of premature coronary artery disease.  She has heart disease on both sides of her family, including multiple family members with heart attacks under the age of 60.  Recently, her brother  of cardiac arrest, presumably due to acute myocardial infarction.  Her mother had heart disease beginning in her early 50s with a heart attack and stents.  She went on to have a pacemaker/defibrillator.  Her father's side also had evidence of premature heart disease.    She became more concerned about her current symptoms after her brother  suddenly.  Sharon has no history of hypertension or dyslipidemia.  She is not a diabetic.    She underwent a stress test recently.  This was a stress echocardiogram performed on 2023.  I reviewed the images of that stress test with her today.  There is a small area of possible inducible wall motion abnormality on the stress images involving the mid to distal anterior and distal anterolateral wall suggestive of ischemia.  Wall motion at rest was normal.  There is no significant valvular disease.  She was able to exercise for 15 minutes on the treadmill with no chest pain or EKG changes.  Her resting EKG shows sinus rhythm with minor nonspecific ST and T-wave abnormalities.    PHYSICAL EXAMINATION:  Her blood pressure is 118/64, heart rate 94 and weight 146 pounds.  Her lungs are clear.  Heart rhythm is regular.  She has no cardiac murmurs or carotid bruits.    IMPRESSIONS:  Ms. Sharon Caraballo is a 42-year-old woman with a strong family history of premature coronary artery disease and history of smoking, which she has continued currently.   She recently had a stress echocardiogram for evaluation of shortness of breath and chest discomfort symptoms that appeared to show some potential area of ischemia involving the mid to distal anterior and distal anterolateral wall.  I agree with that concern on my review of the images.  However, she exercised quite well with no chest pain or EKG changes on the treadmill, making the result more equivocal.  I suggested a CT coronary angiogram initially for evaluation and she is agreeable to that.  I prescribed her low-dose aspirin in the meantime.  I also recommended that she take metoprolol succinate 50 mg on the evening prior to the CT scan and 50 mg again on the morning of the CT scan.  That will help provide heart rate slowing benefit to eliminate artifact on the imaging test.    I will get back to her about those results and have her follow up with me in Cardiology Clinic again if there are any concerning findings that we need to discuss further.    Petr Palumbo MD, FACC    cc:  Neda Mancilla, [facility]  [address] [place] [address] [place], [address]    Petr Palumbo MD, FACC        D: 2023   T: 2023   MT: [email or web address]    Name:     TANA GRAY  MRN:      [id number]        Account:      [id number]   :      1980           Service Date: 2023       Document: [id number]

## 2023-05-30 NOTE — LETTER
5/30/2023    Neda Mancilla, FLAVIO  [address] [place] [address]    RE: Sharon Caraballo       Dear Colleague,     I had the pleasure of seeing Sharon Caraballo in the [facility].  HPI and Plan:   See dictation    Today's clinic visit entailed:  Review of the result(s) of each unique test - flp, ecg, stress echo  The following tests were independently interpreted by me as noted in my documentation: ecg, stress echo  Ordering of each unique test  Prescription drug management  35 minutes spent by me on the date of the encounter doing chart review, review of test results, interpretation of tests, patient visit and documentation   Provider  Link to MDM Help Grid     The level of medical decision making during this visit was of moderate complexity.      Orders Placed This Encounter   Procedures    EKG 12-lead complete w/read - Clinics (performed today)       Orders Placed This Encounter   Medications    DISCONTD: metoprolol succinate ER (TOPROL XL) 50 MG 24 hr tablet     Sig: Take one tablet at night on the day prior to the CT scan and one tablet on the morning of the CT scan     Dispense:  2 tablet     Refill:  0    aspirin 81 MG EC tablet     Sig: Take 1 tablet (81 mg) by mouth daily    metoprolol succinate ER (TOPROL XL) 50 MG 24 hr tablet     Sig: Take one tablet at night on the day prior to the CT scan and one tablet on the morning of the CT scan     Dispense:  2 tablet     Refill:  0       Medications Discontinued During This Encounter   Medication Reason    methylPREDNISolone (MEDROL DOSEPAK) 4 MG tablet therapy pack     nicotine (NICODERM CQ) 14 MG/24HR 24 hr patch     cyclobenzaprine (FLEXERIL) 10 MG tablet     acetaminophen (TYLENOL) 500 MG tablet     ibuprofen (ADVIL/MOTRIN) 200 MG tablet     metoprolol succinate ER (TOPROL XL) 50 MG 24 hr tablet          Encounter Diagnoses   Name Primary?    Abnormal stress test     Chest pain, unspecified type Yes    Family history of  heart attack        CURRENT MEDICATIONS:  Current Outpatient Medications   Medication Sig Dispense Refill    aspirin 81 MG EC tablet Take 1 tablet (81 mg) by mouth daily      metoprolol succinate ER (TOPROL XL) 50 MG 24 hr tablet Take one tablet at night on the day prior to the CT scan and one tablet on the morning of the CT scan 2 tablet 0       ALLERGIES     Allergies   Allergen Reactions    Penicillins Itching    Wellbutrin [Bupropion] Nausea and Vomiting       PAST MEDICAL HISTORY:  Past Medical History:   Diagnosis Date    Female infertility of unspecified origin 2014    GDM, class A1 3/23/2015    Lump or mass in breast     Miscarriage     x3    Septate uterus 2014    Tubal pregnancy        PAST SURGICAL HISTORY:  Past Surgical History:   Procedure Laterality Date    ABDOMEN SURGERY      tubal pregnancy, gullbladder    CHOLECYSTECTOMY      laparoscopic    COLONOSCOPY      DILATION AND CURETTAGE      at the time of her tubal pregnancy    GYN SURGERY Right 2005    right tubal pregnancy       FAMILY HISTORY:  Family History   Problem Relation Age of Onset    Heart Disease Mother 53        MI, s/p stents and defribrillator    Breast Cancer Mother     Arthritis Sister     Diabetes Sister         adult-onset    Obesity Sister     Diabetes Paternal Grandmother     Heart Disease Paternal Grandmother     Cerebrovascular Disease Paternal Grandfather        SOCIAL HISTORY:  Social History     Socioeconomic History    Marital status:      Spouse name: Everette Caraballo    Number of children: 1    Years of education: None    Highest education level: None   Occupational History    Occupation: Accounts Payable     Employer: [employer]   Tobacco Use    Smoking status: Every Day     Packs/day: 0.50     Years: 10.00     Pack years: 5.00     Types: Cigarettes     Last attempt to quit: 2017     Years since quittin.3    Smokeless tobacco: Never    Tobacco comments:     4-5 cigs per day  "  Vaping Use    Vaping status: Never Used   Substance and Sexual Activity    Alcohol use: Yes     Comment: rare    Drug use: No    Sexual activity: Yes     Partners: Male   Other Topics Concern     Service No    Blood Transfusions No    Caffeine Concern No    Occupational Exposure Yes     Comment: Accounts payable    Hobby Hazards No    Sleep Concern No    Stress Concern No    Weight Concern No    Special Diet No    Back Care Yes     Comment: 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  Everette smokes.  Advised about secondhand smoke.  No concerns about domestic violence.  No indoor cats/kittens.       Review of Systems:  Skin:          Eyes:         ENT:         Respiratory:  Negative shortness of breath;cough;wheezing     Cardiovascular:  Negative;edema;lightheadedness;syncope or near-syncope;dizziness Positive for;chest pain;palpitations left sided pain,  Gastroenterology:        Genitourinary:         Musculoskeletal:         Neurologic:  Negative numbness or tingling of hands;numbness or tingling of feet    Psychiatric:         Heme/Lymph/Imm:  Positive for allergies    Endocrine:           Physical Exam:  Vitals: /64 (BP Location: Right arm, Patient Position: Sitting, Cuff Size: Adult Regular)   Pulse 94   Ht 1.598 m (5' 2.9\")   Wt 66.5 kg (146 lb 11.2 oz)   LMP 05/14/2023   SpO2 98%   BMI 26.07 kg/m      Constitutional:           Skin:             Head:           Eyes:           Lymph:      ENT:           Neck:           Respiratory:            Cardiac:                                                           GI:           Extremities and Muscular Skeletal:                 Neurological:           Psych:           CC  Neda Mancilla, NP  [address] [place] [address]                Service Date: 05/30/2023    HISTORY OF PRESENT ILLNESS:  I had the " opportunity to see Ms. Sharon Caraballo in Cardiology Clinic today at [facility] in [place] for consultation regarding symptoms of shortness of breath and chest discomfort.  She is a 42-year-old woman who is currently smoking, who has a strong family history of premature coronary artery disease.  She has heart disease on both sides of her family, including multiple family members with heart attacks under the age of 60.  Recently, her brother  of cardiac arrest, presumably due to acute myocardial infarction.  Her mother had heart disease beginning in her early 50s with a heart attack and stents.  She went on to have a pacemaker/defibrillator.  Her father's side also had evidence of premature heart disease.    She became more concerned about her current symptoms after her brother  suddenly.  Sharon has no history of hypertension or dyslipidemia.  She is not a diabetic.    She underwent a stress test recently.  This was a stress echocardiogram performed on 2023.  I reviewed the images of that stress test with her today.  There is a small area of possible inducible wall motion abnormality on the stress images involving the mid to distal anterior and distal anterolateral wall suggestive of ischemia.  Wall motion at rest was normal.  There is no significant valvular disease.  She was able to exercise for 15 minutes on the treadmill with no chest pain or EKG changes.  Her resting EKG shows sinus rhythm with minor nonspecific ST and T-wave abnormalities.    PHYSICAL EXAMINATION:  Her blood pressure is 118/64, heart rate 94 and weight 146 pounds.  Her lungs are clear.  Heart rhythm is regular.  She has no cardiac murmurs or carotid bruits.    IMPRESSIONS:  Ms. Sharon Caraballo is a 42-year-old woman with a strong family history of premature coronary artery disease and history of smoking, which she has continued currently.  She recently had a stress echocardiogram for evaluation of  shortness of breath and chest discomfort symptoms that appeared to show some potential area of ischemia involving the mid to distal anterior and distal anterolateral wall.  I agree with that concern on my review of the images.  However, she exercised quite well with no chest pain or EKG changes on the treadmill, making the result more equivocal.  I suggested a CT coronary angiogram initially for evaluation and she is agreeable to that.  I prescribed her low-dose aspirin in the meantime.  I also recommended that she take metoprolol succinate 50 mg on the evening prior to the CT scan and 50 mg again on the morning of the CT scan.  That will help provide heart rate slowing benefit to eliminate artifact on the imaging test.    I will get back to her about those results and have her follow up with me in Cardiology Clinic again if there are any concerning findings that we need to discuss further.    Rey Nichoslon MD, FACC    cc:  Neda Mancilla CNP  [facility]  [address] [place], [address]    Rey Nicholson MD, FACC    D: 2023   T: 2023   MT: [email or web address]    Name:     TANA GRAY  MRN:      [id number]        Account:      [id number]   :      1980           Service Date: 2023       Document: [id number]     Thank you for allowing me to participate in the care of your patient.    Sincerely,   REY NICHOLSON MD     [facility]

## 2023-06-07 ENCOUNTER — HOSPITAL ENCOUNTER (OUTPATIENT)
Dept: CARDIOLOGY | Facility: CLINIC | Age: 43
Discharge: HOME | End: 2023-06-07
Attending: INTERNAL MEDICINE | Admitting: INTERNAL MEDICINE
Payer: COMMERCIAL

## 2023-06-07 VITALS — HEART RATE: 61 BPM | DIASTOLIC BLOOD PRESSURE: 62 MMHG | SYSTOLIC BLOOD PRESSURE: 113 MMHG

## 2023-06-07 DIAGNOSIS — R94.39 ABNORMAL STRESS TEST: ICD-10-CM

## 2023-06-07 DIAGNOSIS — R07.9 CHEST PAIN, UNSPECIFIED TYPE: ICD-10-CM

## 2023-06-07 PROCEDURE — 250N000013 HC RX MED GY IP 250 OP 250 PS 637: Performed by: INTERNAL MEDICINE

## 2023-06-07 PROCEDURE — 250N000011 HC RX IP 250 OP 636: Performed by: INTERNAL MEDICINE

## 2023-06-07 PROCEDURE — 75574 CT ANGIO HRT W/3D IMAGE: CPT

## 2023-06-07 PROCEDURE — 75574 CT ANGIO HRT W/3D IMAGE: CPT | Mod: 26 | Performed by: INTERNAL MEDICINE

## 2023-06-07 RX ORDER — IVABRADINE 5 MG/1
5-15 TABLET, FILM COATED ORAL
Status: DISCONTINUED | OUTPATIENT
Start: 2023-06-07 | End: 2023-06-08 | Stop reason: HOSPADM

## 2023-06-07 RX ORDER — DIPHENHYDRAMINE HCL 25 MG
25 CAPSULE ORAL
Status: DISCONTINUED | OUTPATIENT
Start: 2023-06-07 | End: 2023-06-08 | Stop reason: HOSPADM

## 2023-06-07 RX ORDER — NITROGLYCERIN 0.4 MG/1
0.4 TABLET SUBLINGUAL
Status: DISCONTINUED | OUTPATIENT
Start: 2023-06-07 | End: 2023-06-08 | Stop reason: HOSPADM

## 2023-06-07 RX ORDER — METOPROLOL TARTRATE 25 MG/1
25-100 TABLET, FILM COATED ORAL
Status: COMPLETED | OUTPATIENT
Start: 2023-06-07 | End: 2023-06-07

## 2023-06-07 RX ORDER — DIPHENHYDRAMINE HYDROCHLORIDE 50 MG/ML
25-50 INJECTION INTRAMUSCULAR; INTRAVENOUS
Status: DISCONTINUED | OUTPATIENT
Start: 2023-06-07 | End: 2023-06-08 | Stop reason: HOSPADM

## 2023-06-07 RX ORDER — METOPROLOL TARTRATE 1 MG/ML
5-15 INJECTION, SOLUTION INTRAVENOUS
Status: DISCONTINUED | OUTPATIENT
Start: 2023-06-07 | End: 2023-06-08 | Stop reason: HOSPADM

## 2023-06-07 RX ORDER — ONDANSETRON 2 MG/ML
4 INJECTION INTRAMUSCULAR; INTRAVENOUS
Status: DISCONTINUED | OUTPATIENT
Start: 2023-06-07 | End: 2023-06-08 | Stop reason: HOSPADM

## 2023-06-07 RX ORDER — METHYLPREDNISOLONE SODIUM SUCCINATE 125 MG/2ML
125 INJECTION, POWDER, LYOPHILIZED, FOR SOLUTION INTRAMUSCULAR; INTRAVENOUS
Status: DISCONTINUED | OUTPATIENT
Start: 2023-06-07 | End: 2023-06-08 | Stop reason: HOSPADM

## 2023-06-07 RX ORDER — DILTIAZEM HCL 60 MG
120 TABLET ORAL
Status: DISCONTINUED | OUTPATIENT
Start: 2023-06-07 | End: 2023-06-08 | Stop reason: HOSPADM

## 2023-06-07 RX ORDER — DILTIAZEM HYDROCHLORIDE 5 MG/ML
10-15 INJECTION INTRAVENOUS
Status: DISCONTINUED | OUTPATIENT
Start: 2023-06-07 | End: 2023-06-08 | Stop reason: HOSPADM

## 2023-06-07 RX ORDER — IOPAMIDOL 755 MG/ML
500 INJECTION, SOLUTION INTRAVASCULAR ONCE
Status: COMPLETED | OUTPATIENT
Start: 2023-06-07 | End: 2023-06-07

## 2023-06-07 RX ORDER — ACYCLOVIR 200 MG/1
0-1 CAPSULE ORAL
Status: DISCONTINUED | OUTPATIENT
Start: 2023-06-07 | End: 2023-06-08 | Stop reason: HOSPADM

## 2023-06-07 RX ADMIN — IOPAMIDOL 110 ML: 755 INJECTION, SOLUTION INTRAVENOUS at 11:29

## 2023-06-07 RX ADMIN — METOPROLOL TARTRATE 25 MG: 25 TABLET, FILM COATED ORAL at 10:02

## 2023-06-07 RX ADMIN — NITROGLYCERIN 0.4 MG: 0.4 TABLET SUBLINGUAL at 11:15

## 2023-06-08 ENCOUNTER — CARE COORDINATION (OUTPATIENT)
Dept: CARDIOLOGY | Facility: CLINIC | Age: 43
End: 2023-06-08
Payer: COMMERCIAL

## 2023-06-08 NOTE — PROGRESS NOTES
Coronary CTA results from 23 noted. Pt had 23 visit with  for abnormal stress test and family hx of early CAD. Will route update on results to . Stephanie GRIFFIN 2023, 3:01 PM       FINDINGS:     CORONARY CALCIUM SCORE: The total Agatston calcium score is 0.415,  Left main: 0, left anterior descendin.415,  circumflex: 0, right  coronary artery: 0. A percentile cannot be given due to the patient's  age.      CORONARY ANGIOGRAPHY: Trivial coronary artery disease.     CORONARY ANGIOGRAPHY     DOMINANCE: Right dominant system.      LEFT MAIN:      The left main arises from the left coronary cusp.      The left main is trivially stenosed (<25%) with noncalcified plaque.        LEFT ANTERIOR DESCENDING ARTERY:      The proximal left anterior descending artery is trivially stenosed  with noncalcified plaque. The mid left anterior descending artery is  mildly calcified and trivially stenosed with calcified plaque. The  distal left anterior descending artery is trivially stenosed with  noncalcified plaque. The first diagonal artery small caliber vessel  which appears to be patent without significant stenosis or plaque. The  second diagonal artery is a small caliber vessel which is patent.        LEFT CIRCUMFLEX ARTERY:      The proximal, mid and distal circumflex appears to be at most  trivially stenosed with noncalcified plaque. The first obtuse marginal  artery was somewhat difficult to interpret as it is distal. However  reviewing this vessel in multiple phases it appears to be at most  trivially stenosed with noncalcified plaque. The very distal portion  of the vessel is small in caliber and difficult to assess for stenosis  but is patent.        RIGHT CORONARY ARTERY:     The proximal and mid right coronary artery appears to be trivially  stenosed with noncalcified plaque. The distal right coronary artery  appears to be widely patent without stenosis or plaque. The  posterolateral  artery appears to be trivially stenosed with  noncalcified plaque. The posterior descending artery is a small  caliber vessel which appears to be patent without stenosis.         ADDITIONAL FINDINGS:      The aortic root is normal in dimension measuring 2.93 x 3.27 cm. The  visualized the proximal ascending aorta is normal in dimension  measuring 2.99 x 2.90 cm. Aortic valve appears to be trileaflet.     There are 3 right pulmonary veins and 2 left pulmonary veins draining  into the left atrium      There is no left ventricular mass or thrombus. The left atrial  appendage appears to be free of thrombus.     Normal pericardial thickness. There is no pericardial effusion.     The proximal pulmonary arteries are well opacified.

## 2023-06-19 NOTE — TELEPHONE ENCOUNTER
Coronary CTA looks good. No significant CAD. Calcium score essentially zero. No further workup needed unless change in symptoms. EE

## 2023-07-22 ENCOUNTER — HEALTH MAINTENANCE LETTER (OUTPATIENT)
Age: 43
End: 2023-07-22

## 2023-09-05 ENCOUNTER — E-VISIT (OUTPATIENT)
Dept: FAMILY MEDICINE | Facility: CLINIC | Age: 43
End: 2023-09-05
Payer: COMMERCIAL

## 2023-09-05 DIAGNOSIS — J40 BRONCHITIS: Primary | ICD-10-CM

## 2023-09-05 PROCEDURE — 99421 OL DIG E/M SVC 5-10 MIN: CPT | Performed by: NURSE PRACTITIONER

## 2023-09-05 RX ORDER — PREDNISONE 20 MG/1
40 TABLET ORAL DAILY
Qty: 10 TABLET | Refills: 0 | Status: SHIPPED | OUTPATIENT
Start: 2023-09-05 | End: 2023-09-10

## 2023-09-05 RX ORDER — BENZONATATE 100 MG/1
100 CAPSULE ORAL 3 TIMES DAILY PRN
Qty: 30 CAPSULE | Refills: 0 | Status: SHIPPED | OUTPATIENT
Start: 2023-09-05

## 2023-09-12 RX ORDER — AZITHROMYCIN 250 MG/1
TABLET, FILM COATED ORAL
Qty: 6 TABLET | Refills: 0 | Status: SHIPPED | OUTPATIENT
Start: 2023-09-12 | End: 2023-09-17

## 2023-12-05 ENCOUNTER — OFFICE VISIT (OUTPATIENT)
Dept: FAMILY MEDICINE | Facility: CLINIC | Age: 43
End: 2023-12-05
Payer: COMMERCIAL

## 2023-12-05 VITALS
HEART RATE: 93 BPM | TEMPERATURE: 97.7 F | WEIGHT: 148 LBS | BODY MASS INDEX: 26.22 KG/M2 | HEIGHT: 63 IN | DIASTOLIC BLOOD PRESSURE: 64 MMHG | OXYGEN SATURATION: 100 % | RESPIRATION RATE: 18 BRPM | SYSTOLIC BLOOD PRESSURE: 102 MMHG

## 2023-12-05 DIAGNOSIS — Z12.11 SCREEN FOR COLON CANCER: ICD-10-CM

## 2023-12-05 DIAGNOSIS — Z12.31 VISIT FOR SCREENING MAMMOGRAM: ICD-10-CM

## 2023-12-05 DIAGNOSIS — L98.9 SKIN LESION: ICD-10-CM

## 2023-12-05 DIAGNOSIS — Z00.00 ROUTINE GENERAL MEDICAL EXAMINATION AT A HEALTH CARE FACILITY: Primary | ICD-10-CM

## 2023-12-05 DIAGNOSIS — Z13.220 SCREENING FOR LIPID DISORDERS: ICD-10-CM

## 2023-12-05 DIAGNOSIS — F17.200 TOBACCO DEPENDENCE SYNDROME: ICD-10-CM

## 2023-12-05 LAB
ANION GAP SERPL CALCULATED.3IONS-SCNC: 11 MMOL/L (ref 7–15)
BUN SERPL-MCNC: 11 MG/DL (ref 6–20)
CALCIUM SERPL-MCNC: 8.9 MG/DL (ref 8.6–10)
CHLORIDE SERPL-SCNC: 106 MMOL/L (ref 98–107)
CHOLEST SERPL-MCNC: 213 MG/DL
CREAT SERPL-MCNC: 0.85 MG/DL (ref 0.51–0.95)
DEPRECATED HCO3 PLAS-SCNC: 22 MMOL/L (ref 22–29)
EGFRCR SERPLBLD CKD-EPI 2021: 86 ML/MIN/1.73M2
GLUCOSE SERPL-MCNC: 100 MG/DL (ref 70–99)
HDLC SERPL-MCNC: 59 MG/DL
LDLC SERPL CALC-MCNC: 138 MG/DL
NONHDLC SERPL-MCNC: 154 MG/DL
POTASSIUM SERPL-SCNC: 4 MMOL/L (ref 3.4–5.3)
SODIUM SERPL-SCNC: 139 MMOL/L (ref 135–145)
TRIGL SERPL-MCNC: 80 MG/DL

## 2023-12-05 PROCEDURE — 99213 OFFICE O/P EST LOW 20 MIN: CPT | Mod: 25 | Performed by: NURSE PRACTITIONER

## 2023-12-05 PROCEDURE — 99396 PREV VISIT EST AGE 40-64: CPT | Performed by: NURSE PRACTITIONER

## 2023-12-05 PROCEDURE — 80048 BASIC METABOLIC PNL TOTAL CA: CPT | Performed by: NURSE PRACTITIONER

## 2023-12-05 PROCEDURE — 80061 LIPID PANEL: CPT | Performed by: NURSE PRACTITIONER

## 2023-12-05 PROCEDURE — 36415 COLL VENOUS BLD VENIPUNCTURE: CPT | Performed by: NURSE PRACTITIONER

## 2023-12-05 ASSESSMENT — ENCOUNTER SYMPTOMS
CONSTIPATION: 0
NAUSEA: 0
ABDOMINAL PAIN: 0
NERVOUS/ANXIOUS: 1
DIZZINESS: 1
SORE THROAT: 0
PARESTHESIAS: 0
SHORTNESS OF BREATH: 0
WEAKNESS: 0
MYALGIAS: 0
BREAST MASS: 0
DYSURIA: 0
EYE PAIN: 0
HEADACHES: 0
DIARRHEA: 0
COUGH: 1
ARTHRALGIAS: 0
FREQUENCY: 0
FEVER: 0
HEARTBURN: 0
PALPITATIONS: 0
HEMATURIA: 0
HEMATOCHEZIA: 0
CHILLS: 0
JOINT SWELLING: 0

## 2023-12-05 ASSESSMENT — PAIN SCALES - GENERAL: PAINLEVEL: NO PAIN (0)

## 2023-12-05 NOTE — PROGRESS NOTES
SUBJECTIVE:   Sharon is a 43 year old, presenting for the following:  Physical        2023     7:03 AM   Additional Questions   Roomed by Louis       Healthy Habits:     Getting at least 3 servings of Calcium per day:  NO    Bi-annual eye exam:  NO    Dental care twice a year:  Yes    Sleep apnea or symptoms of sleep apnea:  None    Diet:  Regular (no restrictions)    Frequency of exercise:  2-3 days/week    Duration of exercise:  15-30 minutes    Taking medications regularly:  Yes    Medication side effects:  None    Additional concerns today:  Yes          Social History     Tobacco Use    Smoking status: Every Day     Packs/day: 0.50     Years: 10.00     Additional pack years: 0.00     Total pack years: 5.00     Types: Cigarettes     Last attempt to quit: 2017     Years since quittin.8    Smokeless tobacco: Never    Tobacco comments:     4-5 cigs per day   Substance Use Topics    Alcohol use: Yes     Comment: rare             2023     7:03 AM   Alcohol Use   Prescreen: >3 drinks/day or >7 drinks/week? No     Reviewed orders with patient.  Reviewed health maintenance and updated orders accordingly - Yes  Lab work is in process    Breast Cancer Screening:    FHS-7:       5/3/2022     8:42 AM 5/3/2022    10:13 AM 2023     7:05 AM   Breast CA Risk Assessment (FHS-7)   Did any of your first-degree relatives have breast or ovarian cancer? Yes No Yes   Did any of your relatives have bilateral breast cancer? Unknown No Unknown   Did any man in your family have breast cancer? No No No   Did any woman in your family have breast and ovarian cancer? Yes No No   Did any woman in your family have breast cancer before age 50 y? No No No   Do you have 2 or more relatives with breast and/or ovarian cancer? No No No   Do you have 2 or more relatives with breast and/or bowel cancer? No No No     click delete button to remove this line now  Mammogram Screening: Recommended annual mammography  Pertinent  mammograms are reviewed under the imaging tab.    History of abnormal Pap smear: NO - age 30-65 PAP every 5 years with negative HPV co-testing recommended      Latest Ref Rng & Units 5/3/2022     9:16 AM 11/27/2017     3:10 PM 11/27/2017     2:55 PM   PAP / HPV   PAP  Negative for Intraepithelial Lesion or Malignancy (NILM)      PAP (Historical)    NIL    HPV 16 DNA Negative Negative  Negative     HPV 18 DNA Negative Negative  Negative     Other HR HPV Negative Negative  Negative       Reviewed and updated as needed this visit by clinical staff   Tobacco  Allergies  Meds              Reviewed and updated as needed this visit by Provider                 Past Medical History:   Diagnosis Date    Female infertility of unspecified origin 8/28/2014    GDM, class A1 3/23/2015    Lump or mass in breast     Miscarriage     x3    Septate uterus 8/28/2014    Tubal pregnancy 2004      Past Surgical History:   Procedure Laterality Date    ABDOMEN SURGERY      tubal pregnancy, gullbladder    CHOLECYSTECTOMY  1999    laparoscopic    COLONOSCOPY      DILATION AND CURETTAGE  2005    at the time of her tubal pregnancy    GYN SURGERY Right 2005    right tubal pregnancy       Review of Systems   Constitutional:  Negative for chills and fever.   HENT:  Negative for congestion, ear pain, hearing loss and sore throat.    Eyes:  Positive for visual disturbance. Negative for pain.   Respiratory:  Positive for cough. Negative for shortness of breath.    Cardiovascular:  Negative for chest pain, palpitations and peripheral edema.   Gastrointestinal:  Negative for abdominal pain, constipation, diarrhea, heartburn, hematochezia and nausea.   Breasts:  Positive for tenderness. Negative for breast mass and discharge.   Genitourinary:  Positive for urgency. Negative for dysuria, frequency, genital sores, hematuria, pelvic pain, vaginal bleeding and vaginal discharge.   Musculoskeletal:  Negative for arthralgias, joint swelling and myalgias.  "  Skin:  Negative for rash.   Neurological:  Positive for dizziness. Negative for weakness, headaches and paresthesias.   Psychiatric/Behavioral:  Positive for mood changes. The patient is nervous/anxious.           OBJECTIVE:   /64   Pulse 93   Temp 97.7  F (36.5  C) (Temporal)   Resp 18   Ht 1.588 m (5' 2.5\")   Wt 67.1 kg (148 lb)   LMP 11/21/2023 (Approximate)   SpO2 100%   BMI 26.64 kg/m    Physical Exam  GENERAL: healthy, alert and no distress  EYES: Eyes grossly normal to inspection, PERRL and conjunctivae and sclerae normal  HENT: ear canals and TM's normal, nose and mouth without ulcers or lesions  NECK: no adenopathy, no asymmetry, masses, or scars and thyroid normal to palpation  RESP: lungs clear to auscultation - no rales, rhonchi or wheezes  CV: regular rate and rhythm, normal S1 S2, no S3 or S4, no murmur, click or rub, no peripheral edema and peripheral pulses strong  ABDOMEN: soft, nontender, no hepatosplenomegaly, no masses and bowel sounds normal  MS: no gross musculoskeletal defects noted, no edema  SKIN: no suspicious lesions or rashes  NEURO: Normal strength and tone, mentation intact and speech normal  PSYCH: mentation appears normal, affect normal/bright    Diagnostic Test Results:  Labs reviewed in Epic  No results found for this or any previous visit (from the past 24 hour(s)).    ASSESSMENT/PLAN:   (Z00.00) Routine general medical examination at a health care facility  (primary encounter diagnosis)  Comment: recommend yearly physicals, declines vaccines  Plan: Basic metabolic panel  (Ca, Cl, CO2, Creat,         Gluc, K, Na, BUN)          (F17.200) Tobacco dependence syndrome  Comment: has cut back to 4-5 cigarettes a day.  Has tried wellbutrin and chantix in the past.  Really wants to quit but is her stress release.  Will consider options and get back to me.      (L98.9) Skin lesion  Comment: would like skin check- referral sent.    Plan: Adult Dermatology  " "Referral    (Z12.11) Screen for colon cancer  Comment: screen  Plan: Colonoscopy Screening  Referral    (Z12.31) Visit for screening mammogram  Comment: screen  Plan: MA SCREENING DIGITAL BILAT - Future  (s+30), MA        Screen Bilateral w/Mk    (Z13.220) Screening for lipid disorders  Comment: screen  Plan: Lipid panel reflex to direct LDL Fasting           Patient has been advised of split billing requirements and indicates understanding: No      COUNSELING:  Reviewed preventive health counseling, as reflected in patient instructions      BMI:   Estimated body mass index is 26.64 kg/m  as calculated from the following:    Height as of this encounter: 1.588 m (5' 2.5\").    Weight as of this encounter: 67.1 kg (148 lb).   Weight management plan: Discussed healthy diet and exercise guidelines      She reports that she has been smoking cigarettes. She has a 5.00 pack-year smoking history. She has never used smokeless tobacco.  Nicotine/Tobacco Cessation Plan:   Information offered: Patient not interested at this time          Neda Mancilla NP  [facility]  "

## 2023-12-09 ENCOUNTER — HEALTH MAINTENANCE LETTER (OUTPATIENT)
Age: 43
End: 2023-12-09

## 2024-04-03 ENCOUNTER — PATIENT OUTREACH (OUTPATIENT)
Dept: CARE COORDINATION | Facility: CLINIC | Age: 44
End: 2024-04-03
Payer: COMMERCIAL

## 2024-05-01 ENCOUNTER — PATIENT OUTREACH (OUTPATIENT)
Dept: CARE COORDINATION | Facility: CLINIC | Age: 44
End: 2024-05-01
Payer: COMMERCIAL

## 2024-07-06 ENCOUNTER — HEALTH MAINTENANCE LETTER (OUTPATIENT)
Age: 44
End: 2024-07-06

## 2024-09-10 NOTE — PROGRESS NOTES
[facility] Dermatology Note    Encounter Date: Sep 11, 2024    Dermatology Problem List:  Last FSE: 9/11/24    1. AK's  - s/p cryo  2. Livedo reticularis    ______________________________________    Impression/Plan:  1. Actinic keratosis x1  - Cryotherapy procedure note: After verbal consent and discussion of risks and benefits including but no limited to dyspigmentation/scar, blister, and pain, 1 was(were) treated with 1-2mm freeze border for 2 cycles with liquid nitrogen. Post cryotherapy instructions were provided.      2. Reassurance provided for benign lesions not treated today including cherry and spider angiomata, solar lentigines, seborrheic keratoses, and banal-appearing melanocytic nevi.       3. Livedo Reticularis  - discussed what is known about the pathology of this condition, reassurance provided      Follow-up in 1-2 years for skin exam.     Staff Involved:   Scribe/Fellow/Staff    Scribe Disclosure:   I, Kimberli Alcala, am serving as a scribe; to document services personally performed by Dao Joaquin MD -based on data collection and the provider's statements to me.     Provider Disclosure:   The documentation recorded by the scribe accurately reflects the services I personally performed and the decisions made by me.    Dao Joaquin MD   of Dermatology  Department of Dermatology  [facility]        CC:   Chief Complaint   Patient presents with    Skin Check     Full body skin check. Spot on nose and forehead. Patient has not had a previous skin check. No known family history of skin cancer.       History of Present Illness:  Ms. Sharon Caraballo is a 43 year old female who presents as a new patient.    Here for skin check. She has some spots of concern on her nose and forehead. Denies any family or personal history of skin cancer.  Today, spot on nose has not been growing, but it is red.    Labs:  None    Physical  exam:  Vitals: There were no vitals taken for this visit.  GEN: This is a well developed, well-nourished female in no acute distress, in a pleasant mood.    SKIN: Apple phototype III  - Full skin, which includes the head/face, both arms, chest, back, abdomen,both legs, genitalia and/or groin buttocks, digits and/or nails, was examined.  - There are dome shaped bright red papules on the head/neck, trunk, extremities.   - Multiple regular brown pigmented macules and papules are identified on the head/neck, trunk, extremities.   - Scattered brown macules on sun exposed areas.  - There are waxy stuck on tan to brown papules on the head/neck, trunk, extremities.   - There is an erythematous macule with overyling adherent scale on the nose.  -faintly erythematous reticular pattern on the arms and legs  - No other lesions of concern on areas examined.     Past Medical History:   Past Medical History:   Diagnosis Date    Female infertility of unspecified origin 2014    GDM, class A1 3/23/2015    Lump or mass in breast     Miscarriage     x3    Septate uterus 2014    Tubal pregnancy 2004     Past Surgical History:   Procedure Laterality Date    ABDOMEN SURGERY      tubal pregnancy, gullbladder    CHOLECYSTECTOMY      laparoscopic    COLONOSCOPY      DILATION AND CURETTAGE      at the time of her tubal pregnancy    GYN SURGERY Right 2005    right tubal pregnancy       Social History:   reports that she has been smoking cigarettes. She started smoking about 17 years ago. She has a 5 pack-year smoking history. She has never used smokeless tobacco. She reports current alcohol use. She reports that she does not use drugs.    Family History:  Family History   Problem Relation Age of Onset    Heart Disease Mother 53        MI, s/p stents and defribrillator    Breast Cancer Mother     Arthritis Sister     Diabetes Sister         adult-onset    Obesity Sister     Coronary Artery Disease Brother 48          of mi    Diabetes Paternal Grandmother     Heart Disease Paternal Grandmother     Cerebrovascular Disease Paternal Grandfather        Medications:  Current Outpatient Medications   Medication Sig Dispense Refill    aspirin 81 MG EC tablet Take 1 tablet (81 mg) by mouth daily      benzonatate (TESSALON) 100 MG capsule Take 1 capsule (100 mg) by mouth 3 times daily as needed for cough 30 capsule 0     Allergies   Allergen Reactions    Penicillins Itching    Wellbutrin [Bupropion] Nausea and Vomiting

## 2024-09-11 ENCOUNTER — OFFICE VISIT (OUTPATIENT)
Dept: DERMATOLOGY | Facility: CLINIC | Age: 44
End: 2024-09-11
Payer: COMMERCIAL

## 2024-09-11 DIAGNOSIS — L82.1 SEBORRHEIC KERATOSIS: ICD-10-CM

## 2024-09-11 DIAGNOSIS — D22.9 MULTIPLE MELANOCYTIC NEVI: ICD-10-CM

## 2024-09-11 DIAGNOSIS — L57.0 ACTINIC KERATOSIS: Primary | ICD-10-CM

## 2024-09-11 DIAGNOSIS — D18.01 CHERRY ANGIOMA: ICD-10-CM

## 2024-09-11 DIAGNOSIS — R23.1 LIVEDO RETICULARIS: ICD-10-CM

## 2024-09-11 DIAGNOSIS — L81.4 SOLAR LENTIGO: ICD-10-CM

## 2024-09-11 PROCEDURE — 17000 DESTRUCT PREMALG LESION: CPT | Performed by: DERMATOLOGY

## 2024-09-11 PROCEDURE — 99203 OFFICE O/P NEW LOW 30 MIN: CPT | Mod: 25 | Performed by: DERMATOLOGY

## 2024-09-11 NOTE — LETTER
9/11/2024      Sharon Caraballo  [address] [facility] [address]      Dear Colleague,    Thank you for referring your patient, Sharon Caraballo, to the [facility]. Please see a copy of my visit note below.    [facility] Dermatology Note    Encounter Date: Sep 11, 2024    Dermatology Problem List:  Last FSE: 9/11/24    1. AK's  - s/p cryo  2. Livedo reticularis    ______________________________________    Impression/Plan:  1. Actinic keratosis x1  - Cryotherapy procedure note: After verbal consent and discussion of risks and benefits including but no limited to dyspigmentation/scar, blister, and pain, 1 was(were) treated with 1-2mm freeze border for 2 cycles with liquid nitrogen. Post cryotherapy instructions were provided.      2. Reassurance provided for benign lesions not treated today including cherry and spider angiomata, solar lentigines, seborrheic keratoses, and banal-appearing melanocytic nevi.       3. Livedo Reticularis  - discussed what is known about the pathology of this condition, reassurance provided      Follow-up in 1-2 years for skin exam.     Staff Involved:   Scribe/Fellow/Staff    Scribe Disclosure:   I, Kimberli Alcala, am serving as a scribe; to document services personally performed by Dao Joaquin MD -based on data collection and the provider's statements to me.     Provider Disclosure:   The documentation recorded by the scribe accurately reflects the services I personally performed and the decisions made by me.    Dao Joaquin MD   of Dermatology  Department of Dermatology  [facility]        CC:   Chief Complaint   Patient presents with     Skin Check     Full body skin check. Spot on nose and forehead. Patient has not had a previous skin check. No known family history of skin cancer.       History of Present Illness:  Ms. Sharon Caraballo is a 43 year old female who  presents as a new patient.    Here for skin check. She has some spots of concern on her nose and forehead. Denies any family or personal history of skin cancer.  Today, spot on nose has not been growing, but it is red.    Labs:  None    Physical exam:  Vitals: There were no vitals taken for this visit.  GEN: This is a well developed, well-nourished female in no acute distress, in a pleasant mood.    SKIN: Apple phototype III  - Full skin, which includes the head/face, both arms, chest, back, abdomen,both legs, genitalia and/or groin buttocks, digits and/or nails, was examined.  - There are dome shaped bright red papules on the head/neck, trunk, extremities.   - Multiple regular brown pigmented macules and papules are identified on the head/neck, trunk, extremities.   - Scattered brown macules on sun exposed areas.  - There are waxy stuck on tan to brown papules on the head/neck, trunk, extremities.   - There is an erythematous macule with overyling adherent scale on the nose.  -faintly erythematous reticular pattern on the arms and legs  - No other lesions of concern on areas examined.     Past Medical History:   Past Medical History:   Diagnosis Date     Female infertility of unspecified origin 8/28/2014     GDM, class A1 3/23/2015     Lump or mass in breast      Miscarriage     x3     Septate uterus 8/28/2014     Tubal pregnancy 2004     Past Surgical History:   Procedure Laterality Date     ABDOMEN SURGERY      tubal pregnancy, gullbladder     CHOLECYSTECTOMY  1999    laparoscopic     COLONOSCOPY       DILATION AND CURETTAGE  2005    at the time of her tubal pregnancy     GYN SURGERY Right 2005    right tubal pregnancy       Social History:   reports that she has been smoking cigarettes. She started smoking about 17 years ago. She has a 5 pack-year smoking history. She has never used smokeless tobacco. She reports current alcohol use. She reports that she does not use drugs.    Family History:  Family  History   Problem Relation Age of Onset     Heart Disease Mother 53        MI, s/p stents and defribrillator     Breast Cancer Mother      Arthritis Sister      Diabetes Sister         adult-onset     Obesity Sister      Coronary Artery Disease Brother 48         of mi     Diabetes Paternal Grandmother      Heart Disease Paternal Grandmother      Cerebrovascular Disease Paternal Grandfather        Medications:  Current Outpatient Medications   Medication Sig Dispense Refill     aspirin 81 MG EC tablet Take 1 tablet (81 mg) by mouth daily       benzonatate (TESSALON) 100 MG capsule Take 1 capsule (100 mg) by mouth 3 times daily as needed for cough 30 capsule 0     Allergies   Allergen Reactions     Penicillins Itching     Wellbutrin [Bupropion] Nausea and Vomiting                    Again, thank you for allowing me to participate in the care of your patient.        Sincerely,        Dao Joaquin MD

## 2024-09-11 NOTE — NURSING NOTE
Sharon Caraballo's goals for this visit include:   Chief Complaint   Patient presents with    Skin Check     Full body skin check. Spot on nose and forehead. Patient has not had a previous skin check. No known family history of skin cancer.       She requests these members of her care team be copied on today's visit information:     PCP: Neda Mancilla    Referring Provider:  Neda Mancilla, FLAVIO  [address] [place] [address]    There were no vitals taken for this visit.    Do you need any medication refills at today's visit? Ligia Nayak CMA

## 2024-10-30 ENCOUNTER — PATIENT OUTREACH (OUTPATIENT)
Dept: CARE COORDINATION | Facility: CLINIC | Age: 44
End: 2024-10-30
Payer: COMMERCIAL

## 2024-12-09 ENCOUNTER — PATIENT OUTREACH (OUTPATIENT)
Dept: CARE COORDINATION | Facility: CLINIC | Age: 44
End: 2024-12-09
Payer: COMMERCIAL

## 2025-03-28 ENCOUNTER — HOSPITAL ENCOUNTER (EMERGENCY)
Facility: CLINIC | Age: 45
Discharge: HOME | End: 2025-03-28
Attending: STUDENT IN AN ORGANIZED HEALTH CARE EDUCATION/TRAINING PROGRAM | Admitting: STUDENT IN AN ORGANIZED HEALTH CARE EDUCATION/TRAINING PROGRAM
Payer: COMMERCIAL

## 2025-03-28 ENCOUNTER — APPOINTMENT (OUTPATIENT)
Dept: CARDIOLOGY | Facility: CLINIC | Age: 45
End: 2025-03-28
Attending: STUDENT IN AN ORGANIZED HEALTH CARE EDUCATION/TRAINING PROGRAM
Payer: COMMERCIAL

## 2025-03-28 ENCOUNTER — APPOINTMENT (OUTPATIENT)
Dept: ULTRASOUND IMAGING | Facility: CLINIC | Age: 45
End: 2025-03-28
Attending: STUDENT IN AN ORGANIZED HEALTH CARE EDUCATION/TRAINING PROGRAM
Payer: COMMERCIAL

## 2025-03-28 ENCOUNTER — APPOINTMENT (OUTPATIENT)
Dept: GENERAL RADIOLOGY | Facility: CLINIC | Age: 45
End: 2025-03-28
Attending: STUDENT IN AN ORGANIZED HEALTH CARE EDUCATION/TRAINING PROGRAM
Payer: COMMERCIAL

## 2025-03-28 VITALS
WEIGHT: 152 LBS | BODY MASS INDEX: 26.93 KG/M2 | RESPIRATION RATE: 20 BRPM | TEMPERATURE: 98 F | HEART RATE: 89 BPM | SYSTOLIC BLOOD PRESSURE: 108 MMHG | OXYGEN SATURATION: 100 % | DIASTOLIC BLOOD PRESSURE: 71 MMHG

## 2025-03-28 DIAGNOSIS — R07.9 CHEST PAIN, UNSPECIFIED TYPE: ICD-10-CM

## 2025-03-28 DIAGNOSIS — R60.0 PERIPHERAL EDEMA: ICD-10-CM

## 2025-03-28 LAB
ANION GAP SERPL CALCULATED.3IONS-SCNC: 12 MMOL/L (ref 7–15)
ATRIAL RATE - MUSE: 91 BPM
BASOPHILS # BLD AUTO: 0.1 10E3/UL (ref 0–0.2)
BASOPHILS NFR BLD AUTO: 2 %
BUN SERPL-MCNC: 9.2 MG/DL (ref 6–20)
CALCIUM SERPL-MCNC: 9.1 MG/DL (ref 8.8–10.4)
CHLORIDE SERPL-SCNC: 106 MMOL/L (ref 98–107)
CREAT SERPL-MCNC: 0.75 MG/DL (ref 0.51–0.95)
D DIMER PPP FEU-MCNC: 0.39 UG/ML FEU (ref 0–0.5)
DIASTOLIC BLOOD PRESSURE - MUSE: NORMAL MMHG
EGFRCR SERPLBLD CKD-EPI 2021: >90 ML/MIN/1.73M2
EOSINOPHIL # BLD AUTO: 0.3 10E3/UL (ref 0–0.7)
EOSINOPHIL NFR BLD AUTO: 4 %
ERYTHROCYTE [DISTWIDTH] IN BLOOD BY AUTOMATED COUNT: 12.6 % (ref 10–15)
GLUCOSE SERPL-MCNC: 86 MG/DL (ref 70–99)
HCO3 SERPL-SCNC: 21 MMOL/L (ref 22–29)
HCT VFR BLD AUTO: 40.1 % (ref 35–47)
HGB BLD-MCNC: 13.4 G/DL (ref 11.7–15.7)
IMM GRANULOCYTES # BLD: 0 10E3/UL
IMM GRANULOCYTES NFR BLD: 0 %
INTERPRETATION ECG - MUSE: NORMAL
LVEF ECHO: NORMAL
LYMPHOCYTES # BLD AUTO: 2.1 10E3/UL (ref 0.8–5.3)
LYMPHOCYTES NFR BLD AUTO: 26 %
MCH RBC QN AUTO: 30.1 PG (ref 26.5–33)
MCHC RBC AUTO-ENTMCNC: 33.4 G/DL (ref 31.5–36.5)
MCV RBC AUTO: 90 FL (ref 78–100)
MONOCYTES # BLD AUTO: 0.5 10E3/UL (ref 0–1.3)
MONOCYTES NFR BLD AUTO: 6 %
NEUTROPHILS # BLD AUTO: 5.1 10E3/UL (ref 1.6–8.3)
NEUTROPHILS NFR BLD AUTO: 63 %
NRBC # BLD AUTO: 0 10E3/UL
NRBC BLD AUTO-RTO: 0 /100
NT-PROBNP SERPL-MCNC: 107 PG/ML (ref 0–450)
P AXIS - MUSE: 80 DEGREES
PLATELET # BLD AUTO: 439 10E3/UL (ref 150–450)
POTASSIUM SERPL-SCNC: 3.9 MMOL/L (ref 3.4–5.3)
PR INTERVAL - MUSE: 174 MS
QRS DURATION - MUSE: 82 MS
QT - MUSE: 376 MS
QTC - MUSE: 462 MS
R AXIS - MUSE: -13 DEGREES
RBC # BLD AUTO: 4.45 10E6/UL (ref 3.8–5.2)
SODIUM SERPL-SCNC: 139 MMOL/L (ref 135–145)
SYSTOLIC BLOOD PRESSURE - MUSE: NORMAL MMHG
T AXIS - MUSE: 50 DEGREES
TROPONIN T SERPL HS-MCNC: <6 NG/L
VENTRICULAR RATE- MUSE: 91 BPM
WBC # BLD AUTO: 8.1 10E3/UL (ref 4–11)

## 2025-03-28 PROCEDURE — 83880 ASSAY OF NATRIURETIC PEPTIDE: CPT | Performed by: STUDENT IN AN ORGANIZED HEALTH CARE EDUCATION/TRAINING PROGRAM

## 2025-03-28 PROCEDURE — 36415 COLL VENOUS BLD VENIPUNCTURE: CPT | Performed by: STUDENT IN AN ORGANIZED HEALTH CARE EDUCATION/TRAINING PROGRAM

## 2025-03-28 PROCEDURE — 85004 AUTOMATED DIFF WBC COUNT: CPT | Performed by: STUDENT IN AN ORGANIZED HEALTH CARE EDUCATION/TRAINING PROGRAM

## 2025-03-28 PROCEDURE — 71045 X-RAY EXAM CHEST 1 VIEW: CPT

## 2025-03-28 PROCEDURE — 93971 EXTREMITY STUDY: CPT | Mod: LT

## 2025-03-28 PROCEDURE — 82565 ASSAY OF CREATININE: CPT | Performed by: STUDENT IN AN ORGANIZED HEALTH CARE EDUCATION/TRAINING PROGRAM

## 2025-03-28 PROCEDURE — 85018 HEMOGLOBIN: CPT | Performed by: STUDENT IN AN ORGANIZED HEALTH CARE EDUCATION/TRAINING PROGRAM

## 2025-03-28 PROCEDURE — 93306 TTE W/DOPPLER COMPLETE: CPT

## 2025-03-28 PROCEDURE — 84484 ASSAY OF TROPONIN QUANT: CPT | Performed by: STUDENT IN AN ORGANIZED HEALTH CARE EDUCATION/TRAINING PROGRAM

## 2025-03-28 PROCEDURE — 99285 EMERGENCY DEPT VISIT HI MDM: CPT | Mod: 25 | Performed by: STUDENT IN AN ORGANIZED HEALTH CARE EDUCATION/TRAINING PROGRAM

## 2025-03-28 PROCEDURE — 93306 TTE W/DOPPLER COMPLETE: CPT | Mod: 26 | Performed by: INTERNAL MEDICINE

## 2025-03-28 PROCEDURE — 93005 ELECTROCARDIOGRAM TRACING: CPT | Performed by: STUDENT IN AN ORGANIZED HEALTH CARE EDUCATION/TRAINING PROGRAM

## 2025-03-28 PROCEDURE — 84132 ASSAY OF SERUM POTASSIUM: CPT | Performed by: STUDENT IN AN ORGANIZED HEALTH CARE EDUCATION/TRAINING PROGRAM

## 2025-03-28 PROCEDURE — 93010 ELECTROCARDIOGRAM REPORT: CPT | Performed by: STUDENT IN AN ORGANIZED HEALTH CARE EDUCATION/TRAINING PROGRAM

## 2025-03-28 PROCEDURE — 99284 EMERGENCY DEPT VISIT MOD MDM: CPT | Performed by: STUDENT IN AN ORGANIZED HEALTH CARE EDUCATION/TRAINING PROGRAM

## 2025-03-28 PROCEDURE — 85379 FIBRIN DEGRADATION QUANT: CPT | Performed by: STUDENT IN AN ORGANIZED HEALTH CARE EDUCATION/TRAINING PROGRAM

## 2025-03-28 PROCEDURE — 80048 BASIC METABOLIC PNL TOTAL CA: CPT | Performed by: STUDENT IN AN ORGANIZED HEALTH CARE EDUCATION/TRAINING PROGRAM

## 2025-03-28 ASSESSMENT — ACTIVITIES OF DAILY LIVING (ADL)
ADLS_ACUITY_SCORE: 41

## 2025-03-28 ASSESSMENT — COLUMBIA-SUICIDE SEVERITY RATING SCALE - C-SSRS
1. IN THE PAST MONTH, HAVE YOU WISHED YOU WERE DEAD OR WISHED YOU COULD GO TO SLEEP AND NOT WAKE UP?: NO
2. HAVE YOU ACTUALLY HAD ANY THOUGHTS OF KILLING YOURSELF IN THE PAST MONTH?: NO
6. HAVE YOU EVER DONE ANYTHING, STARTED TO DO ANYTHING, OR PREPARED TO DO ANYTHING TO END YOUR LIFE?: NO

## 2025-03-28 NOTE — ED PROVIDER NOTES
History     Chief Complaint   Patient presents with    Chest Pain     HPI  Sharon Caraballo is a 44 year old female who presents to the emergency department with concerns of chest pain.  This is located in the left side of the chest it did seem to radiate to the left shoulder.  Yesterday she had tingling sensation in her left arm that subsequently went away.  Denies any cough, shortness of breath.  This discomfort started yesterday evening roughly 14 hours prior to arrival when she was walking, has seemed to improve and currently a 4 out of 10.  Did not take any medications for this.  She does note there is a strong family history of heart disease.  She mentions that her left leg is mildly swollen compared to the right, this is been present for the past few weeks    Allergies:  Allergies   Allergen Reactions    Penicillins Itching    Wellbutrin [Bupropion] Nausea and Vomiting       Problem List:    Patient Active Problem List    Diagnosis Date Noted    Abnormal stress echo- normal coronary calcium and seen by Cards 2023 05/25/2023     Priority: Medium    Tobacco dependence syndrome 08/31/2018     Priority: Medium    Lump or mass in breast 11/23/2016     Priority: Medium     Right breast cyst 9- oclock.  Normal mammogram in 2014.  No change.        Septate uterus 08/28/2014     Priority: Medium    Encounter for surveillance of other contraceptives 09/02/1997     Priority: Medium        Past Medical History:    Past Medical History:   Diagnosis Date    Female infertility of unspecified origin 8/28/2014    GDM, class A1 3/23/2015    Lump or mass in breast     Miscarriage     Septate uterus 8/28/2014    Tubal pregnancy 2004       Past Surgical History:    Past Surgical History:   Procedure Laterality Date    ABDOMEN SURGERY      tubal pregnancy, gullbladder    CHOLECYSTECTOMY  1999    laparoscopic    COLONOSCOPY      DILATION AND CURETTAGE  2005    at the time of her tubal pregnancy    GYN SURGERY Right 2005     right tubal pregnancy       Family History:    Family History   Problem Relation Age of Onset    Heart Disease Mother 53        MI, s/p stents and defribrillator    Breast Cancer Mother     Arthritis Sister     Diabetes Sister         adult-onset    Obesity Sister     Coronary Artery Disease Brother 48         of mi    Diabetes Paternal Grandmother     Heart Disease Paternal Grandmother     Cerebrovascular Disease Paternal Grandfather        Social History:  Marital Status:   [2]  Social History     Tobacco Use    Smoking status: Former     Current packs/day: 0.50     Average packs/day: 0.5 packs/day for 16.8 years (8.4 ttl pk-yrs)     Types: Cigarettes     Start date: 2007     Quit date: 2017    Smokeless tobacco: Never    Tobacco comments:     10-12 cigs per day   Vaping Use    Vaping status: Never Used   Substance Use Topics    Alcohol use: Yes     Comment: rare    Drug use: No        Medications:    No current outpatient medications on file.        Review of Systems  Gen: No fevers, no unintentional weight changes  Head and neck: No headache, no neck pain  Eye: No eye pain, no vision changes  Respiratory: No shortness of breath, no cough  Cardiovascular: no palpitations  Abdominal: No vomiting, no constipation, no diarrhea  Urinary: No dysuria, no hematuria  Musculoskeletal: No joint redness, no trauma  Neurologic: No confusion, no weakness, no sensation changes  Psychiatric: No suicidal ideation, no homicidal ideation    Physical Exam   BP: 114/90  Pulse: 104  Temp: 98  F (36.7  C)  Resp: 18  Weight: 68.9 kg (152 lb)  SpO2: 100 %      Physical Exam  General: Alert, awake, no distress  Head: Normocephalic, atraumatic  Eyes: Grossly intact extraocular movements, conjunctiva clear, pupils equal   Mouth: Mucous membranes moist  Neck: Supple, grossly full range of motion, no observable masses  Respiratory: No distress,  clear to auscultation bilaterally  Cardiac: S1 and S2 cardiac sounds  appreciated, normal rate, trace edema to the left lower extremity, no calf tenderness bilaterally, negative Homans' sign bilaterally  Abdominal: Soft, not distended, no tenderness appreciated  Neurologic: Normal level of consciousness, speech is clear and appropriate, moving all extremities grossly normal and symmetric  Skin: No gross rashes or lesions  Psych: Normal mood and appropriate for situation        ED Course        Procedures                  Results for orders placed or performed during the hospital encounter of 03/28/25 (from the past 24 hours)   EKG 12-lead, tracing only   Result Value Ref Range    Systolic Blood Pressure  mmHg    Diastolic Blood Pressure  mmHg    Ventricular Rate 91 BPM    Atrial Rate 91 BPM    WA Interval 174 ms    QRS Duration 82 ms     ms    QTc 462 ms    P Axis 80 degrees    R AXIS -13 degrees    T Axis 50 degrees    Interpretation ECG       Sinus rhythm with sinus arrhythmia  Normal ECG  No previous ECGs available     [place] Draw *Canceled*    Narrative    The following orders were created for panel order [place] Draw.  Procedure                               Abnormality         Status                     ---------                               -----------         ------                       Please view results for these tests on the individual orders.   CBC with Platelets & Differential    Narrative    The following orders were created for panel order CBC with Platelets & Differential.  Procedure                               Abnormality         Status                     ---------                               -----------         ------                     CBC with platelets and ...[[id number]]                      Final result                 Please view results for these tests on the individual orders.   Basic metabolic panel   Result Value Ref Range    Sodium 139 135 - 145 mmol/L    Potassium 3.9 3.4 - 5.3 mmol/L    Chloride 106 98 - 107 mmol/L    Carbon Dioxide (CO2)  21 (L) 22 - 29 mmol/L    Anion Gap 12 7 - 15 mmol/L    Urea Nitrogen 9.2 6.0 - 20.0 mg/dL    Creatinine 0.75 0.51 - 0.95 mg/dL    GFR Estimate >90 >60 mL/min/1.73m2    Calcium 9.1 8.8 - 10.4 mg/dL    Glucose 86 70 - 99 mg/dL   Troponin T, High Sensitivity   Result Value Ref Range    Troponin T, High Sensitivity <6 <=14 ng/L   D dimer quantitative   Result Value Ref Range    D-Dimer Quantitative 0.39 0.00 - 0.50 ug/mL FEU    Narrative    This D-dimer assay is intended for use in conjunction with a clinical pretest probability assessment model to exclude pulmonary embolism (PE) and deep venous thrombosis (DVT) in outpatients suspected of PE or DVT. The cut-off value is 0.50 ug/mL FEU.   CBC with platelets and differential   Result Value Ref Range    WBC Count 8.1 4.0 - 11.0 10e3/uL    RBC Count 4.45 3.80 - 5.20 10e6/uL    Hemoglobin 13.4 11.7 - 15.7 g/dL    Hematocrit 40.1 35.0 - 47.0 %    MCV 90 78 - 100 fL    MCH 30.1 26.5 - 33.0 pg    MCHC 33.4 31.5 - 36.5 g/dL    RDW 12.6 10.0 - 15.0 %    Platelet Count 439 150 - 450 10e3/uL    % Neutrophils 63 %    % Lymphocytes 26 %    % Monocytes 6 %    % Eosinophils 4 %    % Basophils 2 %    % Immature Granulocytes 0 %    NRBCs per 100 WBC 0 <1 /100    Absolute Neutrophils 5.1 1.6 - 8.3 10e3/uL    Absolute Lymphocytes 2.1 0.8 - 5.3 10e3/uL    Absolute Monocytes 0.5 0.0 - 1.3 10e3/uL    Absolute Eosinophils 0.3 0.0 - 0.7 10e3/uL    Absolute Basophils 0.1 0.0 - 0.2 10e3/uL    Absolute Immature Granulocytes 0.0 <=0.4 10e3/uL    Absolute NRBCs 0.0 10e3/uL   Nt probnp inpatient (BNP)   Result Value Ref Range    N terminal Pro BNP Inpatient 107 0 - 450 pg/mL   US Lower Extremity Venous Duplex Left    Narrative    EXAM: US LOWER EXTREMITY VENOUS DUPLEX LEFT  LOCATION: [facility]  DATE: 3/28/2025    INDICATION: Left leg swelling.  COMPARISON: None.  TECHNIQUE: Venous duplex ultrasound of the left lower extremity with and without compression,  augmentation and duplex. Color flow and spectral Doppler with waveform analysis performed.    FINDINGS: Exam includes the common femoral, femoral, popliteal, and contralateral common femoral veins as well as segmentally visualized deep calf veins and greater saphenous vein.     LEFT: No deep vein thrombosis. No superficial thrombophlebitis. No popliteal cyst. Along the lateral posterior left ankle subcutaneous tissues is a flat fluid collection that is 3.5 x 0.5 x 0.9 cm.      Impression    IMPRESSION:  1.  No deep venous thrombosis in the left lower extremity.  2.  Lateral posterior left ankle subcutaneous flat fluid collection measures up to 3.5 cm.   XR Chest Port 1 View    Narrative    EXAM: XR CHEST PORT 1 VIEW  LOCATION: [facility]  DATE: 3/28/2025    INDICATION: Chest pain  COMPARISON: 2013      Impression    IMPRESSION: Mild interstitial opacities are seen within the lower lungs. Differential diagnosis includes subsegmental atelectasis, pulmonary edema or atypical pneumonia. Normal cardiomediastinal silhouette. No acute bony abnormality.   Echocardiogram Complete   Result Value Ref Range    LVEF  55%     Narrative    [id number]  [id number]  [id number]  [id number]^MOHIT^MIGUEL A     [facility]  Echocardiography Laboratory  [address] [place] [address]     Name: TANA GRAY  MRN: [id number]  : 1980  Study Date: 2025 12:02 PM  Age: 44 yrs  Gender: Female  Patient Location: [facility]  Reason For Study: Chest Discomfort  History: tobacco, family history of CAD  Ordering Physician: MIGUEL A RUEDA  Referring Physician: Neda Mancilla  Performed By: Tracey Giles     BSA: 1.7 m2  Height: 63 in  Weight: 152 lb  HR: 72  BP: 108/71 mmHg  ______________________________________________________________________________  Procedure  Echocardiogram with two-dimensional, color and spectral Doppler. Adequate  quality two-dimensional was performed  and interpreted.  ______________________________________________________________________________  Interpretation Summary     1. Normal left ventricular size and systolic function. Estimated ejection  fraction is 55%.  2. Findings consistent with normal left ventricular filling pressure.  3. Normal right ventricular size and systolic function.  4. No significant valve disease.  5. No pericardial effusion.  6. No significant change compared to rest findings from prior stress  echocardiogram from 5/24/2023.  ______________________________________________________________________________  Left Ventricle  The left ventricle is normal in size. There is normal left ventricular wall  thickness. Left ventricular systolic function is normal. Diastolic Doppler  findings (E/E' ratio and/or other parameters) suggest left ventricular filling  pressures are normal. The visual ejection fraction is estimated at 55%. No  regional wall motion abnormalities noted.     Right Ventricle  The right ventricle is normal in size and function.     Atria  Normal left atrial size. Right atrial size is normal. There is no color  Doppler evidence of an atrial shunt.     Mitral Valve  The mitral valve is normal in structure and function.     Tricuspid Valve  The tricuspid valve is not well visualized. There is trace tricuspid  regurgitation. Right ventricular systolic pressure could not be approximated  due to inadequate tricuspid regurgitation.     Aortic Valve  The aortic valve is trileaflet. No aortic regurgitation is present. No aortic  stenosis is present.     Pulmonic Valve  The pulmonic valve is normal in structure and function.     Vessels  The aortic root is normal size. Normal size ascending aorta. The inferior vena  cava was normal in size with preserved respiratory variability.     Pericardium  There is no pericardial effusion.     Rhythm  Sinus rhythm was  noted.  ______________________________________________________________________________  MMode/2D Measurements & Calculations  IVSd: 0.86 cm     LVIDd: 4.3 cm  LVIDs: 3.0 cm  LVPWd: 0.81 cm  FS: 29.5 %  LV mass(C)d: 109.7 grams  LV mass(C)dI: 63.8 grams/m2  Ao root diam: 2.7 cm  LA dimension: 3.0 cm  asc Aorta Diam: 2.9 cm  LA/Ao: 1.1  Ao root diam index Ht(cm/m): 1.7  Ao root diam index BSA (cm/m2): 1.6  Asc Ao diam index BSA (cm/m2): 1.7  Asc Ao diam index Ht(cm/m): 1.8  EF Biplane: 64.0 %  LA Volume (BP): 13.1 ml     LA Volume Index (BP): 7.6 ml/m2  RWT: 0.38  TAPSE: 2.8 cm     Doppler Measurements & Calculations  MV E max berto: 60.7 cm/sec  MV A max berto: 50.3 cm/sec  MV E/A: 1.2  MV dec time: 0.29 sec  Ao V2 max: 103.2 cm/sec  Ao max P.0 mmHg  LV V1 max P.8 mmHg  LV V1 max: 66.2 cm/sec  PA V2 max: 77.6 cm/sec  PA max P.4 mmHg  PA acc time: 0.12 sec  PI end-d berto: 94.5 cm/sec  AV Berto Ratio (DI): 0.64  Medial E/e': 5.9  RV S Berto: 11.9 cm/sec     ______________________________________________________________________________  Report approved by: Tito Linn MD on 2025 03:00 PM             Medications - No data to display    Assessments & Plan (with Medical Decision Making)   Initially heart rate 104, more consistently in the room in the 90s.  Rest of vital signs are reassuring.  Reviewed her previous EMR.  I interpreted her EKG is a sinus rhythm with a rate of 91, reassuring intervals, no gross ischemic changes.  Reviewed her previous coronary testing including a stress echo that was followed with a CT calcium score that was reassuring.      She mentions that she is anxious and curious if this is related to anxiety or panic.  Does not have a formal diagnosis of this and is not currently taking any daily or as needed medications at home for this.  A chest pain workup was ordered, discussed that we will perform the same testing whether we give her a trial of an antianxiety medication in the interim  however she does not wish to pursue this.  We discussed various anxiety options including hydroxyzine and benzodiazepines.    Troponin is negative.  BNP reassuring.Rest of blood work is unremarkable including a D-dimer.  Chest x-ray does not show any acute abnormalities, mild interstitial opacities in the lower lungs, today favored subsegmental atelectasis, does not have clinical features of generalized fluid overload, no pneumonia type symptoms.  Duplex of the lower extremity does not have any DVT.  Did pursue an echocardiogram that is overall reassuring.  Discussed all of her workup in detail today.  She states she does feel better during her time in the ER.  She states she feels well enough to go home.  Given her episode of chest pain we will have her follow-up with the cardiology clinic.  We discussed return precautions prior to discharge.    Discussed todays ER visit and current agreed upon treatment plan. Education provided and all questions answered. Instructed to follow up with pcp to discuss ER visit, make sure they are doing well, and to follow up on any incidental findings. Encouraged to come back to the ER for re evaluation if worsening or any other concerns.     I have reviewed the nursing notes.    I have reviewed the findings, diagnosis, plan and need for follow up with the patient.        There are no discharge medications for this patient.      Final diagnoses:   Chest pain, unspecified type   Peripheral edema       3/28/2025   [facility] EMERGENCY DEPT       James Aparicio MD  03/28/25 [id number]

## 2025-03-28 NOTE — ED TRIAGE NOTES
Pt presents with chest pain that started last night during a walk. Pt has left sided chest pain and left arm tingling. Pt very anxious. She has noticed swelling in her ankles and she states her brother passed away from a heart attack at age 40

## 2025-03-28 NOTE — DISCHARGE INSTRUCTIONS
Your workup today is reassuring.  And glad that you are feeling better.  We discussed all the testing in detail today.  I think it is still reasonable if you follow-up with the cardiology clinic for consideration of further heart testing.  If your symptoms return or any other concerning symptoms arise feel free to come back to the hospital for reevaluation.  We discussed various ways to help with your left ankle swelling, compression stockings, keeping it elevated, leg exercises.    Please call your primary doctor in the morning to discuss your ER visit, make sure you are doing well, and to follow up on any incidental findings. Please come back to the ER for re evaluation if you feel like things are getting worse or have other concerns.

## 2025-04-14 ENCOUNTER — TELEPHONE (OUTPATIENT)
Dept: FAMILY MEDICINE | Facility: CLINIC | Age: 45
End: 2025-04-14
Payer: COMMERCIAL

## 2025-04-14 NOTE — TELEPHONE ENCOUNTER
Patient Quality Outreach    Patient is due for the following:   Colon Cancer Screening  Breast Cancer Screening - Mammogram  Depression  -  PHQ-9 needed    Action(s) Taken:   Patient was assigned appropriate questionnaire to complete    Type of outreach:    Sent [employer] message.    Questions for provider review:    None         Olya Coley MA  Chart routed to None.

## (undated) RX ORDER — METOPROLOL TARTRATE 25 MG/1
TABLET, FILM COATED ORAL
Status: DISPENSED
Start: 2023-06-07